# Patient Record
Sex: FEMALE | Race: OTHER | HISPANIC OR LATINO | URBAN - METROPOLITAN AREA
[De-identification: names, ages, dates, MRNs, and addresses within clinical notes are randomized per-mention and may not be internally consistent; named-entity substitution may affect disease eponyms.]

---

## 2017-02-16 ENCOUNTER — EMERGENCY (EMERGENCY)
Facility: HOSPITAL | Age: 22
LOS: 1 days | Discharge: HOME | End: 2017-02-16
Attending: EMERGENCY MEDICINE | Admitting: EMERGENCY MEDICINE
Payer: MEDICAID

## 2017-02-16 VITALS
OXYGEN SATURATION: 100 % | RESPIRATION RATE: 12 BRPM | DIASTOLIC BLOOD PRESSURE: 77 MMHG | HEIGHT: 63 IN | HEART RATE: 95 BPM | TEMPERATURE: 98 F | SYSTOLIC BLOOD PRESSURE: 135 MMHG | WEIGHT: 130.07 LBS

## 2017-02-16 VITALS
TEMPERATURE: 98 F | RESPIRATION RATE: 20 BRPM | DIASTOLIC BLOOD PRESSURE: 73 MMHG | HEART RATE: 76 BPM | SYSTOLIC BLOOD PRESSURE: 113 MMHG | OXYGEN SATURATION: 99 %

## 2017-02-16 DIAGNOSIS — M54.5 LOW BACK PAIN: ICD-10-CM

## 2017-02-16 LAB
APPEARANCE UR: CLEAR — SIGNIFICANT CHANGE UP
BACTERIA # UR AUTO: ABNORMAL
BILIRUB UR-MCNC: NEGATIVE — SIGNIFICANT CHANGE UP
COLOR SPEC: YELLOW — SIGNIFICANT CHANGE UP
DIFF PNL FLD: ABNORMAL
EPI CELLS # UR: ABNORMAL
GLUCOSE UR QL: NEGATIVE — SIGNIFICANT CHANGE UP
HCG UR QL: NEGATIVE — SIGNIFICANT CHANGE UP
KETONES UR-MCNC: NEGATIVE — SIGNIFICANT CHANGE UP
LEUKOCYTE ESTERASE UR-ACNC: ABNORMAL
NITRITE UR-MCNC: NEGATIVE — SIGNIFICANT CHANGE UP
PH UR: 5 — SIGNIFICANT CHANGE UP (ref 4.8–8)
PROT UR-MCNC: NEGATIVE — SIGNIFICANT CHANGE UP
RBC CASTS # UR COMP ASSIST: SIGNIFICANT CHANGE UP /HPF (ref 0–4)
SP GR SPEC: 1.01 — SIGNIFICANT CHANGE UP (ref 1.01–1.02)
UROBILINOGEN FLD QL: NEGATIVE — SIGNIFICANT CHANGE UP
WBC UR QL: SIGNIFICANT CHANGE UP

## 2017-02-16 PROCEDURE — 87086 URINE CULTURE/COLONY COUNT: CPT

## 2017-02-16 PROCEDURE — 81025 URINE PREGNANCY TEST: CPT

## 2017-02-16 PROCEDURE — 99283 EMERGENCY DEPT VISIT LOW MDM: CPT

## 2017-02-16 PROCEDURE — 81001 URINALYSIS AUTO W/SCOPE: CPT

## 2017-02-16 RX ORDER — IBUPROFEN 200 MG
600 TABLET ORAL ONCE
Qty: 0 | Refills: 0 | Status: COMPLETED | OUTPATIENT
Start: 2017-02-16 | End: 2017-02-16

## 2017-02-16 RX ADMIN — Medication 600 MILLIGRAM(S): at 22:10

## 2017-02-16 NOTE — ED ADULT NURSE REASSESSMENT NOTE - NS ED NURSE REASSESS COMMENT FT1
patient alert oriented follows command, complain of left lower back pain 10/10, denies N/V
discharge instructions explained and a hard copy provided, patient is been discharged in stable conditions

## 2017-02-16 NOTE — ED PROVIDER NOTE - OBJECTIVE STATEMENT
pt c/o approx 1 week of left low back pain. pt reports frequently lifting heavy objects at work recently. no fevers, chills, weakness, numbness, incontinence, abd pain, dysuria, hematuria. pain worse with changes in position  ortho - none

## 2017-02-16 NOTE — ED PROVIDER NOTE - CHPI ED SYMPTOMS NEG
no difficulty bearing weight/no numbness/no bowel dysfunction/no bladder dysfunction/no motor function loss

## 2017-02-17 LAB
CULTURE RESULTS: NO GROWTH — SIGNIFICANT CHANGE UP
SPECIMEN SOURCE: SIGNIFICANT CHANGE UP

## 2017-11-06 ENCOUNTER — TRANSCRIPTION ENCOUNTER (OUTPATIENT)
Age: 22
End: 2017-11-06

## 2018-04-30 ENCOUNTER — EMERGENCY (EMERGENCY)
Facility: HOSPITAL | Age: 23
LOS: 1 days | Discharge: HOME | End: 2018-04-30
Attending: EMERGENCY MEDICINE | Admitting: EMERGENCY MEDICINE
Payer: SELF-PAY

## 2018-04-30 VITALS
HEIGHT: 63 IN | OXYGEN SATURATION: 99 % | SYSTOLIC BLOOD PRESSURE: 124 MMHG | DIASTOLIC BLOOD PRESSURE: 83 MMHG | RESPIRATION RATE: 15 BRPM | HEART RATE: 110 BPM | WEIGHT: 134.92 LBS | TEMPERATURE: 99 F

## 2018-04-30 LAB — S PYO AG SPEC QL IA: POSITIVE

## 2018-04-30 PROCEDURE — 99284 EMERGENCY DEPT VISIT MOD MDM: CPT

## 2018-04-30 RX ORDER — ACETAMINOPHEN 500 MG
650 TABLET ORAL ONCE
Qty: 0 | Refills: 0 | Status: COMPLETED | OUTPATIENT
Start: 2018-04-30 | End: 2018-04-30

## 2018-04-30 RX ORDER — AMOXICILLIN 250 MG/5ML
1 SUSPENSION, RECONSTITUTED, ORAL (ML) ORAL
Qty: 20 | Refills: 0 | OUTPATIENT
Start: 2018-04-30 | End: 2018-05-09

## 2018-04-30 RX ORDER — BENZOCAINE AND MENTHOL 5; 1 G/100ML; G/100ML
1 LIQUID ORAL ONCE
Qty: 0 | Refills: 0 | Status: COMPLETED | OUTPATIENT
Start: 2018-04-30 | End: 2018-04-30

## 2018-04-30 RX ADMIN — Medication 650 MILLIGRAM(S): at 22:28

## 2018-04-30 RX ADMIN — Medication 650 MILLIGRAM(S): at 21:26

## 2018-04-30 RX ADMIN — Medication 1 TABLET(S): at 22:31

## 2018-04-30 RX ADMIN — BENZOCAINE AND MENTHOL 1 LOZENGE: 5; 1 LIQUID ORAL at 21:26

## 2018-04-30 NOTE — ED PROVIDER NOTE - CHPI ED SYMPTOMS NEG
no blurred vision/no syncope/no change in level of consciousness/no chills/no numbness/no loss of consciousness/no nausea/no weakness/no fever/no vomiting

## 2018-04-30 NOTE — ED PROVIDER NOTE - THROAT FINDINGS
NO DROOLING/NO VESICLES/ULCERS/no redness/uvula midline/no vesicles/NO TONGUE ELEVATION/NO STRIDOR/no exudate

## 2018-04-30 NOTE — ED PROVIDER NOTE - OBJECTIVE STATEMENT
24 yo F with no med hx co sore throat and ear pain for 2 days. Denies fever, cough, N, V, or other symptom. Took Nyquil last night with some relief. Denies recent travel or sick contacts.

## 2018-05-01 PROCEDURE — 99283 EMERGENCY DEPT VISIT LOW MDM: CPT

## 2018-05-01 PROCEDURE — 87880 STREP A ASSAY W/OPTIC: CPT

## 2018-05-01 PROCEDURE — 87081 CULTURE SCREEN ONLY: CPT

## 2018-05-01 RX ORDER — AMOXICILLIN 250 MG/5ML
1 SUSPENSION, RECONSTITUTED, ORAL (ML) ORAL
Qty: 20 | Refills: 0 | OUTPATIENT
Start: 2018-05-01 | End: 2018-05-10

## 2018-05-01 RX ORDER — AMOXICILLIN 250 MG/5ML
1 SUSPENSION, RECONSTITUTED, ORAL (ML) ORAL
Qty: 20 | Refills: 0
Start: 2018-05-01 | End: 2018-05-10

## 2019-02-28 ENCOUNTER — EMERGENCY (EMERGENCY)
Facility: HOSPITAL | Age: 24
LOS: 1 days | Discharge: HOME | End: 2019-02-28
Attending: EMERGENCY MEDICINE | Admitting: EMERGENCY MEDICINE
Payer: MEDICAID

## 2019-02-28 VITALS
HEART RATE: 67 BPM | RESPIRATION RATE: 18 BRPM | OXYGEN SATURATION: 100 % | DIASTOLIC BLOOD PRESSURE: 76 MMHG | SYSTOLIC BLOOD PRESSURE: 116 MMHG | HEIGHT: 63 IN | TEMPERATURE: 99 F | WEIGHT: 145.06 LBS

## 2019-02-28 VITALS
DIASTOLIC BLOOD PRESSURE: 61 MMHG | OXYGEN SATURATION: 99 % | RESPIRATION RATE: 16 BRPM | SYSTOLIC BLOOD PRESSURE: 96 MMHG | HEART RATE: 79 BPM | TEMPERATURE: 98 F

## 2019-02-28 LAB
ANION GAP SERPL CALC-SCNC: 7 MMOL/L — SIGNIFICANT CHANGE UP (ref 5–17)
BASOPHILS # BLD AUTO: 0.02 K/UL — SIGNIFICANT CHANGE UP (ref 0–0.2)
BASOPHILS NFR BLD AUTO: 0.3 % — SIGNIFICANT CHANGE UP (ref 0–2)
BUN SERPL-MCNC: 11 MG/DL — SIGNIFICANT CHANGE UP (ref 7–23)
CALCIUM SERPL-MCNC: 8.6 MG/DL — SIGNIFICANT CHANGE UP (ref 8.5–10.1)
CHLORIDE SERPL-SCNC: 108 MMOL/L — SIGNIFICANT CHANGE UP (ref 96–108)
CO2 SERPL-SCNC: 25 MMOL/L — SIGNIFICANT CHANGE UP (ref 22–31)
CREAT SERPL-MCNC: 0.71 MG/DL — SIGNIFICANT CHANGE UP (ref 0.5–1.3)
EOSINOPHIL # BLD AUTO: 0.03 K/UL — SIGNIFICANT CHANGE UP (ref 0–0.5)
EOSINOPHIL NFR BLD AUTO: 0.4 % — SIGNIFICANT CHANGE UP (ref 0–6)
GLUCOSE SERPL-MCNC: 101 MG/DL — HIGH (ref 70–99)
HCG SERPL-ACNC: <1 MIU/ML — SIGNIFICANT CHANGE UP
HCT VFR BLD CALC: 41.1 % — SIGNIFICANT CHANGE UP (ref 34.5–45)
HGB BLD-MCNC: 13.4 G/DL — SIGNIFICANT CHANGE UP (ref 11.5–15.5)
IMM GRANULOCYTES NFR BLD AUTO: 0.1 % — SIGNIFICANT CHANGE UP (ref 0–1.5)
LYMPHOCYTES # BLD AUTO: 2.38 K/UL — SIGNIFICANT CHANGE UP (ref 1–3.3)
LYMPHOCYTES # BLD AUTO: 30.9 % — SIGNIFICANT CHANGE UP (ref 13–44)
MCHC RBC-ENTMCNC: 30 PG — SIGNIFICANT CHANGE UP (ref 27–34)
MCHC RBC-ENTMCNC: 32.6 GM/DL — SIGNIFICANT CHANGE UP (ref 32–36)
MCV RBC AUTO: 92.2 FL — SIGNIFICANT CHANGE UP (ref 80–100)
MONOCYTES # BLD AUTO: 0.57 K/UL — SIGNIFICANT CHANGE UP (ref 0–0.9)
MONOCYTES NFR BLD AUTO: 7.4 % — SIGNIFICANT CHANGE UP (ref 2–14)
NEUTROPHILS # BLD AUTO: 4.68 K/UL — SIGNIFICANT CHANGE UP (ref 1.8–7.4)
NEUTROPHILS NFR BLD AUTO: 60.9 % — SIGNIFICANT CHANGE UP (ref 43–77)
NRBC # BLD: 0 /100 WBCS — SIGNIFICANT CHANGE UP (ref 0–0)
PLATELET # BLD AUTO: 249 K/UL — SIGNIFICANT CHANGE UP (ref 150–400)
POTASSIUM SERPL-MCNC: 4 MMOL/L — SIGNIFICANT CHANGE UP (ref 3.5–5.3)
POTASSIUM SERPL-SCNC: 4 MMOL/L — SIGNIFICANT CHANGE UP (ref 3.5–5.3)
RBC # BLD: 4.46 M/UL — SIGNIFICANT CHANGE UP (ref 3.8–5.2)
RBC # FLD: 13 % — SIGNIFICANT CHANGE UP (ref 10.3–14.5)
SODIUM SERPL-SCNC: 140 MMOL/L — SIGNIFICANT CHANGE UP (ref 135–145)
WBC # BLD: 7.69 K/UL — SIGNIFICANT CHANGE UP (ref 3.8–10.5)
WBC # FLD AUTO: 7.69 K/UL — SIGNIFICANT CHANGE UP (ref 3.8–10.5)

## 2019-02-28 PROCEDURE — 70450 CT HEAD/BRAIN W/O DYE: CPT | Mod: 26

## 2019-02-28 PROCEDURE — 72125 CT NECK SPINE W/O DYE: CPT | Mod: 26

## 2019-02-28 PROCEDURE — 93005 ELECTROCARDIOGRAM TRACING: CPT

## 2019-02-28 PROCEDURE — 80048 BASIC METABOLIC PNL TOTAL CA: CPT

## 2019-02-28 PROCEDURE — 85027 COMPLETE CBC AUTOMATED: CPT

## 2019-02-28 PROCEDURE — 93010 ELECTROCARDIOGRAM REPORT: CPT

## 2019-02-28 PROCEDURE — 99284 EMERGENCY DEPT VISIT MOD MDM: CPT

## 2019-02-28 PROCEDURE — 72125 CT NECK SPINE W/O DYE: CPT

## 2019-02-28 PROCEDURE — 84702 CHORIONIC GONADOTROPIN TEST: CPT

## 2019-02-28 PROCEDURE — 99284 EMERGENCY DEPT VISIT MOD MDM: CPT | Mod: 25

## 2019-02-28 PROCEDURE — 70450 CT HEAD/BRAIN W/O DYE: CPT

## 2019-02-28 PROCEDURE — 96361 HYDRATE IV INFUSION ADD-ON: CPT

## 2019-02-28 PROCEDURE — 96374 THER/PROPH/DIAG INJ IV PUSH: CPT

## 2019-02-28 PROCEDURE — 36415 COLL VENOUS BLD VENIPUNCTURE: CPT

## 2019-02-28 RX ORDER — KETOROLAC TROMETHAMINE 30 MG/ML
30 SYRINGE (ML) INJECTION ONCE
Qty: 0 | Refills: 0 | Status: DISCONTINUED | OUTPATIENT
Start: 2019-02-28 | End: 2019-02-28

## 2019-02-28 RX ORDER — IBUPROFEN 200 MG
1 TABLET ORAL
Qty: 30 | Refills: 0
Start: 2019-02-28 | End: 2019-03-09

## 2019-02-28 RX ORDER — SODIUM CHLORIDE 9 MG/ML
1000 INJECTION INTRAMUSCULAR; INTRAVENOUS; SUBCUTANEOUS ONCE
Qty: 0 | Refills: 0 | Status: COMPLETED | OUTPATIENT
Start: 2019-02-28 | End: 2019-02-28

## 2019-02-28 RX ORDER — DIAZEPAM 5 MG
5 TABLET ORAL ONCE
Qty: 0 | Refills: 0 | Status: DISCONTINUED | OUTPATIENT
Start: 2019-02-28 | End: 2019-02-28

## 2019-02-28 RX ORDER — LIDOCAINE 4 G/100G
1 CREAM TOPICAL ONCE
Qty: 0 | Refills: 0 | Status: COMPLETED | OUTPATIENT
Start: 2019-02-28 | End: 2019-02-28

## 2019-02-28 RX ORDER — CYCLOBENZAPRINE HYDROCHLORIDE 10 MG/1
1 TABLET, FILM COATED ORAL
Qty: 20 | Refills: 0
Start: 2019-02-28 | End: 2019-03-06

## 2019-02-28 RX ADMIN — SODIUM CHLORIDE 1000 MILLILITER(S): 9 INJECTION INTRAMUSCULAR; INTRAVENOUS; SUBCUTANEOUS at 13:47

## 2019-02-28 RX ADMIN — SODIUM CHLORIDE 1000 MILLILITER(S): 9 INJECTION INTRAMUSCULAR; INTRAVENOUS; SUBCUTANEOUS at 12:24

## 2019-02-28 RX ADMIN — LIDOCAINE 1 PATCH: 4 CREAM TOPICAL at 12:23

## 2019-02-28 RX ADMIN — Medication 30 MILLIGRAM(S): at 13:47

## 2019-02-28 RX ADMIN — Medication 5 MILLIGRAM(S): at 12:23

## 2019-02-28 RX ADMIN — Medication 30 MILLIGRAM(S): at 12:23

## 2019-02-28 NOTE — ED PROVIDER NOTE - CLINICAL SUMMARY MEDICAL DECISION MAKING FREE TEXT BOX
syncopal event that occurred this am. also right sided neck pain and tightness when she woke up (history of similar pain in the past). will CT head and neck. will also check labs, preg, and EKG. will give IVF, toradol, valium, and lidocaine patch.

## 2019-02-28 NOTE — ED PROVIDER NOTE - PROGRESS NOTE DETAILS
Reevaluated patient at bedside.  Patient feeling improved.  Discussed the results of all diagnostic testing in ED and copies of all reports given.   An opportunity to ask questions was given.  Discussed the importance of prompt, close medical follow-up.  Patient will return with any changes, concerns or persistent / worsening symptoms.  Understanding of all instructions verbalized.  stretches to neck explained and demonstrated. will continue motrin and flexeril. will drink plenty of fluids. referral to PCP clinic and spine center provided

## 2019-02-28 NOTE — ED PROVIDER NOTE - ATTENDING CONTRIBUTION TO CARE
Pt is a 23 yo female who presents to the ED with a cc of syncope.  PMHx of fibroadenoma of breast.  Pt reports that she awoke this morning with a "stiff neck."  Pt reports that she had a right sided spasm.  Denies injury to her neck.  She reports that this has happened prior but never as severe.  She was able to get out of bed with help but reported pain to her neck.  Butts then states that she went to the restroom, urinated and then while standing to wash her hands she suddenly had a flash of heat come over her, she became lightheaded and had a syncopal episode.  Denies prior syncopal episode.  Pt does report that she felt nausea just prior to this.  Still reporting neck pain but denies HA, visual changes, N/V/D/C at this time, CP, SOB, abd pain, ext numbness or weakness.  Denies fever, chills.  On exam pt lying in bed NAD, NCAT, PERRL, EOMI, heart RRR, lungs CTA, abd soft NT/ND.  No midline C/T/L TTP no acute step offs or deformities noted.  TTP to right paraspinal cervical muscle region with increased tone and spasm noted.  CN II-XII intact.  Will obtain screening labs, EKG, CT head/cervical spine.  Will medicate for symptoms.  Agree with above plan of care

## 2019-02-28 NOTE — ED PROVIDER NOTE - MUSCULOSKELETAL, MLM
Spine appears normal, range of motion is not limited, no vert step off deformities. soft tissue tenderness and spasm to right upper trap and scalenes.

## 2019-02-28 NOTE — ED PROVIDER NOTE - OBJECTIVE STATEMENT
presents with right sided neck pain and syncopal event that occurred this am. patient woke up with stiff right neck (history of same, but worse in intensity this am). called her parents to help her out of bed. took some tylenol and then went to the bathroom. while washing her hands, started to feel hot, lightheaded, and tunnel vision. states "she passed out, but mother was able to help ease her to the ground". believes "she was out just for a few seconds" but not certain. does not take any blood thinners. denies change of pregnancy. denies history of similar symptoms. no chest pain or YASMEEN. no HA, dizziness, or confusion. continues to have pain and tightness to right side of neck "feels muscular".   no PCP

## 2019-02-28 NOTE — ED ADULT NURSE NOTE - OBJECTIVE STATEMENT
states she woke up with neck pain walked to BR became dizzy and experienced a sudden LOC  denies fever/chills

## 2019-06-27 ENCOUNTER — EMERGENCY (EMERGENCY)
Facility: HOSPITAL | Age: 24
LOS: 1 days | Discharge: HOME | End: 2019-06-27
Attending: EMERGENCY MEDICINE | Admitting: EMERGENCY MEDICINE
Payer: SELF-PAY

## 2019-06-27 VITALS
HEART RATE: 74 BPM | SYSTOLIC BLOOD PRESSURE: 113 MMHG | DIASTOLIC BLOOD PRESSURE: 74 MMHG | OXYGEN SATURATION: 100 % | TEMPERATURE: 99 F | RESPIRATION RATE: 14 BRPM | WEIGHT: 130.07 LBS

## 2019-06-27 VITALS
OXYGEN SATURATION: 99 % | SYSTOLIC BLOOD PRESSURE: 111 MMHG | TEMPERATURE: 98 F | DIASTOLIC BLOOD PRESSURE: 74 MMHG | RESPIRATION RATE: 15 BRPM | HEART RATE: 67 BPM

## 2019-06-27 LAB
ALBUMIN SERPL ELPH-MCNC: 3.8 G/DL — SIGNIFICANT CHANGE UP (ref 3.3–5)
ALP SERPL-CCNC: 55 U/L — SIGNIFICANT CHANGE UP (ref 40–120)
ALT FLD-CCNC: 18 U/L — SIGNIFICANT CHANGE UP (ref 12–78)
ANION GAP SERPL CALC-SCNC: 8 MMOL/L — SIGNIFICANT CHANGE UP (ref 5–17)
AST SERPL-CCNC: 13 U/L — LOW (ref 15–37)
BASOPHILS # BLD AUTO: 0.02 K/UL — SIGNIFICANT CHANGE UP (ref 0–0.2)
BASOPHILS NFR BLD AUTO: 0.3 % — SIGNIFICANT CHANGE UP (ref 0–2)
BILIRUB SERPL-MCNC: 0.5 MG/DL — SIGNIFICANT CHANGE UP (ref 0.2–1.2)
BUN SERPL-MCNC: 9 MG/DL — SIGNIFICANT CHANGE UP (ref 7–23)
CALCIUM SERPL-MCNC: 8.7 MG/DL — SIGNIFICANT CHANGE UP (ref 8.5–10.1)
CHLORIDE SERPL-SCNC: 109 MMOL/L — HIGH (ref 96–108)
CO2 SERPL-SCNC: 25 MMOL/L — SIGNIFICANT CHANGE UP (ref 22–31)
CREAT SERPL-MCNC: 0.61 MG/DL — SIGNIFICANT CHANGE UP (ref 0.5–1.3)
EOSINOPHIL # BLD AUTO: 0.01 K/UL — SIGNIFICANT CHANGE UP (ref 0–0.5)
EOSINOPHIL NFR BLD AUTO: 0.2 % — SIGNIFICANT CHANGE UP (ref 0–6)
GLUCOSE SERPL-MCNC: 101 MG/DL — HIGH (ref 70–99)
HCG SERPL-ACNC: <1 MIU/ML — SIGNIFICANT CHANGE UP
HCT VFR BLD CALC: 38.3 % — SIGNIFICANT CHANGE UP (ref 34.5–45)
HGB BLD-MCNC: 12.8 G/DL — SIGNIFICANT CHANGE UP (ref 11.5–15.5)
IMM GRANULOCYTES NFR BLD AUTO: 0.3 % — SIGNIFICANT CHANGE UP (ref 0–1.5)
LIDOCAIN IGE QN: 92 U/L — SIGNIFICANT CHANGE UP (ref 73–393)
LYMPHOCYTES # BLD AUTO: 1.82 K/UL — SIGNIFICANT CHANGE UP (ref 1–3.3)
LYMPHOCYTES # BLD AUTO: 28 % — SIGNIFICANT CHANGE UP (ref 13–44)
MCHC RBC-ENTMCNC: 29.9 PG — SIGNIFICANT CHANGE UP (ref 27–34)
MCHC RBC-ENTMCNC: 33.4 GM/DL — SIGNIFICANT CHANGE UP (ref 32–36)
MCV RBC AUTO: 89.5 FL — SIGNIFICANT CHANGE UP (ref 80–100)
MONOCYTES # BLD AUTO: 0.46 K/UL — SIGNIFICANT CHANGE UP (ref 0–0.9)
MONOCYTES NFR BLD AUTO: 7.1 % — SIGNIFICANT CHANGE UP (ref 2–14)
NEUTROPHILS # BLD AUTO: 4.17 K/UL — SIGNIFICANT CHANGE UP (ref 1.8–7.4)
NEUTROPHILS NFR BLD AUTO: 64.1 % — SIGNIFICANT CHANGE UP (ref 43–77)
NRBC # BLD: 0 /100 WBCS — SIGNIFICANT CHANGE UP (ref 0–0)
PLATELET # BLD AUTO: 246 K/UL — SIGNIFICANT CHANGE UP (ref 150–400)
POTASSIUM SERPL-MCNC: 3.7 MMOL/L — SIGNIFICANT CHANGE UP (ref 3.5–5.3)
POTASSIUM SERPL-SCNC: 3.7 MMOL/L — SIGNIFICANT CHANGE UP (ref 3.5–5.3)
PROT SERPL-MCNC: 7.4 G/DL — SIGNIFICANT CHANGE UP (ref 6–8.3)
RBC # BLD: 4.28 M/UL — SIGNIFICANT CHANGE UP (ref 3.8–5.2)
RBC # FLD: 13.2 % — SIGNIFICANT CHANGE UP (ref 10.3–14.5)
SODIUM SERPL-SCNC: 142 MMOL/L — SIGNIFICANT CHANGE UP (ref 135–145)
WBC # BLD: 6.5 K/UL — SIGNIFICANT CHANGE UP (ref 3.8–10.5)
WBC # FLD AUTO: 6.5 K/UL — SIGNIFICANT CHANGE UP (ref 3.8–10.5)

## 2019-06-27 PROCEDURE — 36415 COLL VENOUS BLD VENIPUNCTURE: CPT

## 2019-06-27 PROCEDURE — 99285 EMERGENCY DEPT VISIT HI MDM: CPT

## 2019-06-27 PROCEDURE — 76856 US EXAM PELVIC COMPLETE: CPT

## 2019-06-27 PROCEDURE — 76856 US EXAM PELVIC COMPLETE: CPT | Mod: 26

## 2019-06-27 PROCEDURE — 80053 COMPREHEN METABOLIC PANEL: CPT

## 2019-06-27 PROCEDURE — 84702 CHORIONIC GONADOTROPIN TEST: CPT

## 2019-06-27 PROCEDURE — 99284 EMERGENCY DEPT VISIT MOD MDM: CPT | Mod: 25

## 2019-06-27 PROCEDURE — 96374 THER/PROPH/DIAG INJ IV PUSH: CPT | Mod: XU

## 2019-06-27 PROCEDURE — 85027 COMPLETE CBC AUTOMATED: CPT

## 2019-06-27 PROCEDURE — 83690 ASSAY OF LIPASE: CPT

## 2019-06-27 PROCEDURE — 74177 CT ABD & PELVIS W/CONTRAST: CPT | Mod: 26

## 2019-06-27 PROCEDURE — 74177 CT ABD & PELVIS W/CONTRAST: CPT

## 2019-06-27 RX ORDER — ONDANSETRON 8 MG/1
4 TABLET, FILM COATED ORAL ONCE
Refills: 0 | Status: COMPLETED | OUTPATIENT
Start: 2019-06-27 | End: 2019-06-27

## 2019-06-27 RX ORDER — SODIUM CHLORIDE 9 MG/ML
1000 INJECTION INTRAMUSCULAR; INTRAVENOUS; SUBCUTANEOUS ONCE
Refills: 0 | Status: COMPLETED | OUTPATIENT
Start: 2019-06-27 | End: 2019-06-27

## 2019-06-27 RX ORDER — ONDANSETRON 8 MG/1
1 TABLET, FILM COATED ORAL
Qty: 9 | Refills: 0
Start: 2019-06-27 | End: 2019-06-29

## 2019-06-27 RX ORDER — IOHEXOL 300 MG/ML
30 INJECTION, SOLUTION INTRAVENOUS ONCE
Refills: 0 | Status: COMPLETED | OUTPATIENT
Start: 2019-06-27 | End: 2019-06-27

## 2019-06-27 RX ADMIN — ONDANSETRON 4 MILLIGRAM(S): 8 TABLET, FILM COATED ORAL at 09:36

## 2019-06-27 RX ADMIN — SODIUM CHLORIDE 1000 MILLILITER(S): 9 INJECTION INTRAMUSCULAR; INTRAVENOUS; SUBCUTANEOUS at 09:36

## 2019-06-27 RX ADMIN — IOHEXOL 30 MILLILITER(S): 300 INJECTION, SOLUTION INTRAVENOUS at 09:36

## 2019-06-27 NOTE — ED PROVIDER NOTE - PHYSICAL EXAMINATION
Gen: Alert, NAD  Head/eyes: NC/AT, PERRL, EOMI, normal lids/conjunctiva, no scleral icterus  ENT: airway patent  Neck: supple, no tenderness/meningismus/JVD, Trachea midline  Pulm/lung: Bilateral clear BS, normal resp effort, no wheeze/stridor/retractions  CV/heart: RRR, no M/R/G, +2 dist pulses (radial, pedal DP/PT, popliteal)  GI/Abd: soft, +ttp mid abdomen, +ttp RLQ and LLQ/ND, +BS, no guarding/rebound tenderness  Musculoskeletal: no edema/erythema/cyanosis, FROM in all extremities, no C/T/L spine ttp  Skin: no rash, no vesicles, no petechaie, no ecchymosis, no swelling  Neuro: AAOx3, CN 2-12 intact, normal sensation, 5/5 motor strength in all extremities, normal gait, no dysmetria Gen: Alert, NAD  Head/eyes: NC/AT, PERRL, EOMI  ENT: airway patent  Neck: supple, no tenderness/meningismus/JVD, Trachea midline  Pulm/lung: Bilateral clear BS, normal resp effort, no wheeze/stridor/retractions  CV/heart: RRR, no M/R/G, +2 dist pulses (radial, pedal DP/PT, popliteal)  GI/Abd: soft, +ttp mid abdomen, +ttp RLQ and LLQ/ND, +BS, no guarding/rebound tenderness  Musculoskeletal: no edema/erythema/cyanosis, FROM in all extremities, no C/T/L spine ttp  Skin: no rash, no vesicles, no petechaie, no ecchymosis, no swelling  Neuro: AAOx3, CN 2-12 intact, normal sensation, 5/5 motor strength in all extremities, normal gait, no dysmetria

## 2019-06-27 NOTE — ED PROVIDER NOTE - OBJECTIVE STATEMENT
25 yo female no pmhx c/o 1 week of intermittent periumbilical and lower abdominal pain with associated nausea/vomiting.  Nonbloody, nonbilious. Moderate, nonradiating.  No fever/chills.  States she vomits anything she takes in orally.

## 2019-06-27 NOTE — ED ADULT NURSE NOTE - NSIMPLEMENTINTERV_GEN_ALL_ED
Implemented All Universal Safety Interventions:  [place] to call system. Call bell, personal items and telephone within reach. Instruct patient to call for assistance. Room bathroom lighting operational. Non-slip footwear when patient is off stretcher. Physically safe environment: no spills, clutter or unnecessary equipment. Stretcher in lowest position, wheels locked, appropriate side rails in place.

## 2019-06-27 NOTE — ED PROVIDER NOTE - CARE PROVIDER_API CALL
Haja Calvin ()  Internal Medicine  [address] [place] [address]  Phone: [phone]  Fax: [phone]  Follow Up Time:

## 2019-06-27 NOTE — ED PROVIDER NOTE - NSFOLLOWUPINSTRUCTIONS_ED_ALL_ED_FT
1) Follow-up with Dr. Calvin. Call today / next business day for prompt follow-up.  2) Return to Emergency room for any worsening or persistent pain, weakness, fever, or any other concerning symptoms.  3) See attached instruction sheets for additional information, including information regarding signs and symptoms to look out for, reasons to seek immediate care and other important instructions.  4) Zofran 4mg every 8 hours as needed for nausea

## 2019-06-27 NOTE — ED ADULT NURSE NOTE - OBJECTIVE STATEMENT
received pt in bed #8a Pt A&O c/o n/v x 1 week & pain that is only w/ the vomiting. Pt also states she had 2 episodes of diarrhea Pt seen by Dr Banda Will monitor

## 2019-06-27 NOTE — ED PROVIDER NOTE - NS ED ROS FT

## 2019-06-27 NOTE — ED ADULT NURSE REASSESSMENT NOTE - NS ED NURSE REASSESS COMMENT FT1
Report received from Carli SALCIDO. Patient received at 1100. Patient states pain is improved. No complaints voiced at this time. PIV intact. Plan of care discussed with patient. Comfort and safety maintained. Will continue to monitor.

## 2020-03-18 NOTE — ED ADULT NURSE NOTE - PAIN: PRESENCE, MLM
Patient: Jackie Johnson    Procedure Summary     Date:  03/18/20 Room / Location:   [facility] OR 03 /  [facility]    Anesthesia Start:  0752 Anesthesia Stop:  0830    Procedure:  INSERTION OF PORTACATH (N/A ) Diagnosis:       Breast cancer (CMS/HCC)      (Breast cancer (CMS/HCC) [C50.919])    Surgeon:  Dudley Aldridge MD Provider:  Casa Shah MD    Anesthesia Type:  general ASA Status:  3          Anesthesia Type: general    Vitals  Vitals Value Taken Time   /73 3/18/2020  8:31 AM   Temp 97.7 °F (36.5 °C) 3/18/2020  8:31 AM   Pulse 59 3/18/2020  8:31 AM   Resp 18 3/18/2020  8:31 AM   SpO2 100 % 3/18/2020  8:31 AM           Post Anesthesia Care and Evaluation    Patient location during evaluation: PHASE II  Patient participation: complete - patient participated  Level of consciousness: awake and alert  Pain score: 0  Pain management: adequate  Airway patency: patent  Anesthetic complications: No anesthetic complications    Cardiovascular status: acceptable  Respiratory status: acceptable  Hydration status: acceptable      
complains of pain/discomfort

## 2020-04-17 NOTE — ED ADULT NURSE NOTE - HEART RATE (BEATS/MIN)
amitriptyline (ELAVIL) 75 MG tablet (Discontinued)  Patient states that headaches have now started back up and would like to start this medication again  Please advise  Patient is willing to do a phone visit if necessary   67

## 2020-09-25 NOTE — ED ADULT NURSE NOTE - NS ED NURSE RECORD ANOTHER HT AND WT
"HPI:   47 y.o.   OB History        6    Para   5    Term   3       1    AB   1    Living   2       SAB   1    TAB   0    Ectopic   0    Multiple   0    Live Births   2              Patient's last menstrual period was 2020.    Patient is  here for her annual gynecologic exam.  She has no complaints.     ROS:  GENERAL: No fever, chills, fatigability or weight loss.  SKIN: No rashes, itching or changes in color or texture of skin.  HEAD: No headaches or recent head trauma.  EYES: Visual acuity fine. No photophobia, ocular pain or diplopia.  EARS: Denies ear pain, discharge or vertigo.  NOSE: No loss of smell, no epistaxis or postnasal drip.  MOUTH & THROAT: No hoarseness or change in voice. No excessive gum bleeding.  NODES: Denies swollen glands.  CHEST: Denies OROURKE, cyanosis, wheezing, cough and sputum production.  CARDIOVASCULAR: Denies chest pain, PND, orthopnea or reduced exercise tolerance.  ABDOMEN: Appetite fine. No weight loss. Denies diarrhea, abdominal pain, hematemesis or blood in stool.  URINARY: No flank pain, dysuria or hematuria.  PERIPHERAL VASCULAR: No claudication or cyanosis.  MUSCULOSKELETAL: No joint stiffness or swelling. Denies back pain.  NEUROLOGIC: No history of seizures, paralysis, alteration of gait or coordination.    PE:   /88   Ht 5' 8" (1.727 m)   Wt 117 kg (257 lb 15 oz)   LMP 2020   BMI 39.22 kg/m²   APPEARANCE: Well nourished, well developed, in no acute distress.  NECK: Neck symmetric without masses or thyromegaly.  BREASTS: Symmetrical, no skin changes or visible lesions. No palpable masses, nipple discharge or adenopathy bilaterally.  ABDOMEN: Flat. Soft. No tenderness or masses. No hepatosplenomegaly. No hernias. No CVA tenderness.  VULVA: No lesions. Normal female genitalia.  URETHRAL MEATUS: Normal size and location, no lesions, no prolapse.  URETHRA: No masses, tenderness, prolapse or scarring.  VAGINA: Moist and well rugated, no discharge, " no significant cystocele or rectocele.  CERVIX: No lesions and discharge. PAP done.  UTERUS: Normal size, regular shape, mobile, non-tender, bladder base nontender.  ADNEXA: No masses, tenderness or CDS nodularity.  ANUS PERINEUM: Normal.    PROCEDURES:  Pap smear    Assessment:  Normal Gynecologic Exam    Plan:  Mammogram and Colonoscopy if indicated by current recommendations.  Return to clinic in one year or for any problems or complaints.  Reg cycles,     Yes

## 2021-12-18 ENCOUNTER — EMERGENCY (EMERGENCY)
Facility: HOSPITAL | Age: 26
LOS: 1 days | Discharge: HOME | End: 2021-12-18
Attending: EMERGENCY MEDICINE | Admitting: EMERGENCY MEDICINE
Payer: COMMERCIAL

## 2021-12-18 VITALS
WEIGHT: 139.99 LBS | OXYGEN SATURATION: 97 % | RESPIRATION RATE: 18 BRPM | DIASTOLIC BLOOD PRESSURE: 78 MMHG | HEART RATE: 113 BPM | HEIGHT: 63 IN | TEMPERATURE: 99 F | SYSTOLIC BLOOD PRESSURE: 126 MMHG

## 2021-12-18 VITALS
DIASTOLIC BLOOD PRESSURE: 64 MMHG | OXYGEN SATURATION: 98 % | HEART RATE: 89 BPM | RESPIRATION RATE: 18 BRPM | SYSTOLIC BLOOD PRESSURE: 100 MMHG | TEMPERATURE: 99 F

## 2021-12-18 LAB
ALBUMIN SERPL ELPH-MCNC: 3.7 G/DL — SIGNIFICANT CHANGE UP (ref 3.3–5)
ALP SERPL-CCNC: 54 U/L — SIGNIFICANT CHANGE UP (ref 40–120)
ALT FLD-CCNC: 48 U/L — SIGNIFICANT CHANGE UP (ref 12–78)
ANION GAP SERPL CALC-SCNC: 6 MMOL/L — SIGNIFICANT CHANGE UP (ref 5–17)
AST SERPL-CCNC: 30 U/L — SIGNIFICANT CHANGE UP (ref 15–37)
BASOPHILS # BLD AUTO: 0.02 K/UL — SIGNIFICANT CHANGE UP (ref 0–0.2)
BASOPHILS NFR BLD AUTO: 0.2 % — SIGNIFICANT CHANGE UP (ref 0–2)
BILIRUB SERPL-MCNC: 0.7 MG/DL — SIGNIFICANT CHANGE UP (ref 0.2–1.2)
BUN SERPL-MCNC: 15 MG/DL — SIGNIFICANT CHANGE UP (ref 7–23)
CALCIUM SERPL-MCNC: 8.6 MG/DL — SIGNIFICANT CHANGE UP (ref 8.5–10.1)
CHLORIDE SERPL-SCNC: 106 MMOL/L — SIGNIFICANT CHANGE UP (ref 96–108)
CO2 SERPL-SCNC: 26 MMOL/L — SIGNIFICANT CHANGE UP (ref 22–31)
CREAT SERPL-MCNC: 0.78 MG/DL — SIGNIFICANT CHANGE UP (ref 0.5–1.3)
EOSINOPHIL # BLD AUTO: 0 K/UL — SIGNIFICANT CHANGE UP (ref 0–0.5)
EOSINOPHIL NFR BLD AUTO: 0 % — SIGNIFICANT CHANGE UP (ref 0–6)
GLUCOSE SERPL-MCNC: 110 MG/DL — HIGH (ref 70–99)
HCT VFR BLD CALC: 39.7 % — SIGNIFICANT CHANGE UP (ref 34.5–45)
HGB BLD-MCNC: 13.8 G/DL — SIGNIFICANT CHANGE UP (ref 11.5–15.5)
IMM GRANULOCYTES NFR BLD AUTO: 0.4 % — SIGNIFICANT CHANGE UP (ref 0–1.5)
LYMPHOCYTES # BLD AUTO: 0.92 K/UL — LOW (ref 1–3.3)
LYMPHOCYTES # BLD AUTO: 11.4 % — LOW (ref 13–44)
MCHC RBC-ENTMCNC: 30.9 PG — SIGNIFICANT CHANGE UP (ref 27–34)
MCHC RBC-ENTMCNC: 34.8 GM/DL — SIGNIFICANT CHANGE UP (ref 32–36)
MCV RBC AUTO: 88.8 FL — SIGNIFICANT CHANGE UP (ref 80–100)
MONOCYTES # BLD AUTO: 0.52 K/UL — SIGNIFICANT CHANGE UP (ref 0–0.9)
MONOCYTES NFR BLD AUTO: 6.5 % — SIGNIFICANT CHANGE UP (ref 2–14)
NEUTROPHILS # BLD AUTO: 6.55 K/UL — SIGNIFICANT CHANGE UP (ref 1.8–7.4)
NEUTROPHILS NFR BLD AUTO: 81.5 % — HIGH (ref 43–77)
NRBC # BLD: 0 /100 WBCS — SIGNIFICANT CHANGE UP (ref 0–0)
PLATELET # BLD AUTO: 229 K/UL — SIGNIFICANT CHANGE UP (ref 150–400)
POTASSIUM SERPL-MCNC: 3.5 MMOL/L — SIGNIFICANT CHANGE UP (ref 3.5–5.3)
POTASSIUM SERPL-SCNC: 3.5 MMOL/L — SIGNIFICANT CHANGE UP (ref 3.5–5.3)
PROT SERPL-MCNC: 7.9 G/DL — SIGNIFICANT CHANGE UP (ref 6–8.3)
RAPID RVP RESULT: SIGNIFICANT CHANGE UP
RBC # BLD: 4.47 M/UL — SIGNIFICANT CHANGE UP (ref 3.8–5.2)
RBC # FLD: 13.1 % — SIGNIFICANT CHANGE UP (ref 10.3–14.5)
SARS-COV-2 RNA SPEC QL NAA+PROBE: SIGNIFICANT CHANGE UP
SODIUM SERPL-SCNC: 138 MMOL/L — SIGNIFICANT CHANGE UP (ref 135–145)
WBC # BLD: 8.04 K/UL — SIGNIFICANT CHANGE UP (ref 3.8–10.5)
WBC # FLD AUTO: 8.04 K/UL — SIGNIFICANT CHANGE UP (ref 3.8–10.5)

## 2021-12-18 PROCEDURE — 0225U NFCT DS DNA&RNA 21 SARSCOV2: CPT

## 2021-12-18 PROCEDURE — 99284 EMERGENCY DEPT VISIT MOD MDM: CPT

## 2021-12-18 PROCEDURE — 36415 COLL VENOUS BLD VENIPUNCTURE: CPT

## 2021-12-18 PROCEDURE — 96374 THER/PROPH/DIAG INJ IV PUSH: CPT

## 2021-12-18 PROCEDURE — 83690 ASSAY OF LIPASE: CPT

## 2021-12-18 PROCEDURE — 80053 COMPREHEN METABOLIC PANEL: CPT

## 2021-12-18 PROCEDURE — 99284 EMERGENCY DEPT VISIT MOD MDM: CPT | Mod: 25

## 2021-12-18 PROCEDURE — 84702 CHORIONIC GONADOTROPIN TEST: CPT

## 2021-12-18 PROCEDURE — 85025 COMPLETE CBC W/AUTO DIFF WBC: CPT

## 2021-12-18 PROCEDURE — 96375 TX/PRO/DX INJ NEW DRUG ADDON: CPT

## 2021-12-18 PROCEDURE — 96361 HYDRATE IV INFUSION ADD-ON: CPT

## 2021-12-18 RX ORDER — ONDANSETRON 8 MG/1
4 TABLET, FILM COATED ORAL ONCE
Refills: 0 | Status: COMPLETED | OUTPATIENT
Start: 2021-12-18 | End: 2021-12-18

## 2021-12-18 RX ORDER — SODIUM CHLORIDE 9 MG/ML
1000 INJECTION INTRAMUSCULAR; INTRAVENOUS; SUBCUTANEOUS ONCE
Refills: 0 | Status: COMPLETED | OUTPATIENT
Start: 2021-12-18 | End: 2021-12-18

## 2021-12-18 RX ORDER — ONDANSETRON 8 MG/1
1 TABLET, FILM COATED ORAL
Qty: 20 | Refills: 0
Start: 2021-12-18 | End: 2021-12-22

## 2021-12-18 RX ORDER — PANTOPRAZOLE SODIUM 20 MG/1
40 TABLET, DELAYED RELEASE ORAL ONCE
Refills: 0 | Status: COMPLETED | OUTPATIENT
Start: 2021-12-18 | End: 2021-12-18

## 2021-12-18 RX ADMIN — ONDANSETRON 4 MILLIGRAM(S): 8 TABLET, FILM COATED ORAL at 18:44

## 2021-12-18 RX ADMIN — SODIUM CHLORIDE 1000 MILLILITER(S): 9 INJECTION INTRAMUSCULAR; INTRAVENOUS; SUBCUTANEOUS at 19:45

## 2021-12-18 RX ADMIN — SODIUM CHLORIDE 1000 MILLILITER(S): 9 INJECTION INTRAMUSCULAR; INTRAVENOUS; SUBCUTANEOUS at 18:44

## 2021-12-18 RX ADMIN — PANTOPRAZOLE SODIUM 40 MILLIGRAM(S): 20 TABLET, DELAYED RELEASE ORAL at 18:45

## 2021-12-18 NOTE — ED PROVIDER NOTE - CARE PROVIDER_API CALL
Mir Little; PhD)  Infectious Disease; Internal Medicine  [address] [place] [address]  Phone: [phone]  Fax: [phone]  Follow Up Time:

## 2021-12-18 NOTE — ED PROVIDER NOTE - NSICDXFAMILYHX_GEN_ALL_CORE_FT
FAMILY HISTORY:  Father  Still living? Yes, Estimated age: Age Unknown  Family history of diabetes mellitus, Age at diagnosis: Age Unknown  Family history of hypertension, Age at diagnosis: Age Unknown    Mother  Still living? Unknown  Family history of cervical cancer, Age at diagnosis: Age Unknown

## 2021-12-18 NOTE — ED ADULT TRIAGE NOTE - HISTORY OF COVID-19 VACCINATION
[facility] Radiation Treatment Management Note 6-10    Patient:  Kelley Tidwell  Age:  48year old  Visit Diagnosis:  No diagnosis found.   Primary Rad/Onc:  Dr. Lashay Hawthorne    Site Delivered Dose (Gy) Prescribed Dose (Gy) Fraction Yes

## 2021-12-18 NOTE — ED PROVIDER NOTE - CONSTITUTIONAL, MLM
Well appearing, young white female, awake, alert, oriented to person, place, time/situation and in no apparent distress. normal...

## 2021-12-18 NOTE — ED PROVIDER NOTE - NSFOLLOWUPINSTRUCTIONS_ED_ALL_ED_FT
Rest  Increase fluid intake  May take Zofran if needed for nausea/vomiting as directed  Follow-up with your doctor this week  Return here if needed          [employer]® CareNotes®     :  [facility]  	                       GASTROENTERITIS - AfterCare(R) Instructions(ER/ED)           Gastroenteritis    WHAT YOU NEED TO KNOW:    Gastroenteritis, or stomach flu, is an infection of the stomach and intestines.     Digestive Tract         DISCHARGE INSTRUCTIONS:    Call 911 for any of the following:   •You have trouble breathing or a very fast pulse.          Return to the emergency department if:   •You see blood in your diarrhea.      •You cannot stop vomiting.      •You have not urinated for 12 hours.       •You feel like you are going to faint.      Contact your healthcare provider if:   •You have a fever.      •You continue to vomit or have diarrhea, even after treatment.      •You see worms in your diarrhea.      •Your mouth or eyes are dry. You are not urinating as much or as often.      •You have questions or concerns about your condition or care.      Medicines:   •Medicines may be given to stop vomiting or diarrhea, decrease abdominal cramps, or treat an infection.      •Take your medicine as directed. Contact your healthcare provider if you think your medicine is not helping or if you have side effects. Tell him or her if you are allergic to any medicine. Keep a list of the medicines, vitamins, and herbs you take. Include the amounts, and when and why you take them. Bring the list or the pill bottles to follow-up visits. Carry your medicine list with you in case of an emergency.      Manage your symptoms:   •Drink liquids as directed. Ask your healthcare provider how much liquid to drink each day, and which liquids are best for you. You may also need to drink an oral rehydration solution (ORS). An ORS has the right amounts of sugar, salt, and minerals in water to replace body fluids.      •Eat bland foods. When you feel hungry, begin eating soft, bland foods. Examples are bananas, clear soup, potatoes, and applesauce. Do not have dairy products, alcohol, sugary drinks, or drinks with caffeine until you feel better.      •Rest as much as possible. Slowly start to do more each day when you begin to feel better.      Prevent the spread of gastroenteritis: Gastroenteritis can spread easily. Keep yourself, your family, and your surroundings clean to help prevent the spread of gastroenteritis:   •Wash your hands often. Use soap and water. Wash your hands after you use the bathroom, change a child's diapers, or sneeze. Wash your hands before you prepare or eat food.   Handwashing           •Clean surfaces and do laundry often. Wash your clothes and towels separately from the rest of the laundry. Clean surfaces in your home with antibacterial  or bleach.      •Clean food thoroughly and cook safely. Wash raw vegetables before you cook. Cook meat, fish, and eggs fully. Do not use the same dishes for raw meat as you do for other foods. Refrigerate any leftover food immediately.      •Be aware when you camp or travel. Drink only clean water. Do not drink from rivers or lakes unless you purify or boil the water first. When you travel, drink bottled water and do not add ice. Do not eat fruit that has not been peeled. Do not eat raw fish or meat that is not fully cooked.       Follow up with your doctor as directed: Write down your questions so you remember to ask them during your visits.        © Copyright [employer] 2021

## 2021-12-18 NOTE — ED PROVIDER NOTE - PATIENT PORTAL LINK FT
You can access the FollowMyHealth Patient Portal offered by [facility] by registering at the following website: [email or web address]. By joining [employer]’s FollowMyHealth portal, you will also be able to view your health information using other applications (apps) compatible with our system.

## 2021-12-18 NOTE — ED PROVIDER NOTE - CARE PROVIDERS DIRECT ADDRESSES
[email or web address]

## 2021-12-18 NOTE — ED ADULT NURSE NOTE - OBJECTIVE STATEMENT
Received pt alert and orientated. Pt was vomiting and had diarrhea yesterday. 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 Pt is able to walk and move all extremities. Call bell within reach. Freq rounding performed. Safety/Comfort maintained. Will continue to monitor. Pt denies SOB and chest pain.

## 2021-12-18 NOTE — ED PROVIDER NOTE - OBJECTIVE STATEMENT
27 yo white female with 24-hours of nausea, vomiting and non bloody watery diarrhea which began a few hours after eating Mexican food. Patient was the only one that ate their in her party that got ill. In addition to those symptoms, patient developed fever and chills along with ill defined upper abdominal crampy pain. No chest pain. No SOB. No cough. No dysuria or hematuria.

## 2021-12-29 ENCOUNTER — EMERGENCY (EMERGENCY)
Facility: HOSPITAL | Age: 26
LOS: 1 days | Discharge: HOME | End: 2021-12-29
Attending: EMERGENCY MEDICINE | Admitting: EMERGENCY MEDICINE
Payer: COMMERCIAL

## 2021-12-29 VITALS
DIASTOLIC BLOOD PRESSURE: 87 MMHG | HEART RATE: 108 BPM | RESPIRATION RATE: 16 BRPM | OXYGEN SATURATION: 97 % | HEIGHT: 63 IN | SYSTOLIC BLOOD PRESSURE: 120 MMHG | TEMPERATURE: 102 F | WEIGHT: 138.01 LBS

## 2021-12-29 VITALS
RESPIRATION RATE: 16 BRPM | HEART RATE: 95 BPM | TEMPERATURE: 101 F | OXYGEN SATURATION: 98 % | DIASTOLIC BLOOD PRESSURE: 81 MMHG | SYSTOLIC BLOOD PRESSURE: 126 MMHG

## 2021-12-29 LAB — SARS-COV-2 RNA SPEC QL NAA+PROBE: DETECTED

## 2021-12-29 PROCEDURE — 93010 ELECTROCARDIOGRAM REPORT: CPT

## 2021-12-29 PROCEDURE — 99284 EMERGENCY DEPT VISIT MOD MDM: CPT

## 2021-12-29 PROCEDURE — 99283 EMERGENCY DEPT VISIT LOW MDM: CPT

## 2021-12-29 PROCEDURE — 87635 SARS-COV-2 COVID-19 AMP PRB: CPT

## 2021-12-29 PROCEDURE — 93005 ELECTROCARDIOGRAM TRACING: CPT

## 2021-12-29 RX ORDER — ACETAMINOPHEN 500 MG
975 TABLET ORAL ONCE
Refills: 0 | Status: COMPLETED | OUTPATIENT
Start: 2021-12-29 | End: 2021-12-29

## 2021-12-29 RX ADMIN — Medication 975 MILLIGRAM(S): at 21:03

## 2021-12-29 NOTE — ED PROVIDER NOTE - OBJECTIVE STATEMENT
26 female presents to ER c/o low grade fever, sore throat, congestion, headache, fatigue, shortness of breath for 2 days, works as a  and was an exposure in class. Patient has 2 doses of covid pfizer vaccine.

## 2021-12-29 NOTE — ED PROVIDER NOTE - PATIENT PORTAL LINK FT
You can access the FollowMyHealth Patient Portal offered by [facility] by registering at the following website: [email or web address]. By joining [employer]’s FollowMyHealth portal, you will also be able to view your health information using other applications (apps) compatible with our system.

## 2021-12-29 NOTE — ED PROVIDER NOTE - NSFOLLOWUPINSTRUCTIONS_ED_ALL_ED_FT
QUARANTINE keeps someone who was in close contact with someone who has COVID-19 away from others.    Quarantine if you have been in close contact with someone who has COVID-19, unless you have been fully vaccinated.     If you are fully vaccinated     •You do NOT need to quarantine unless they have symptoms      •Get tested 3-5 days after your exposure, even if you don't have symptoms      •Wear a mask indoors in public for 14 days following exposure or until your test result is negative      If you are not fully vaccinated     •Stay home for 14 days after your last contact with a person who has COVID-19      •Watch for fever (100.4°F), cough, shortness of breath, or other symptoms of COVID-19      •If possible, stay away from people you live with, especially people who are at higher risk for getting very sick from COVID-19      •Contact your local public health department for options in your area to possibly shorten your quarantine        ISOLATION keeps someone who is sick or tested positive for COVID-19 without symptoms away from others, even in their own home.    People who are in isolation should stay home and stay in a specific "sick room" or area and use a separate bathroom (if available).     If you are sick and think or know you have COVID-19   Stay home until after   •At least 10 days since symptoms first appeared and      •At least 24 hours with no fever without the use of fever-reducing medications and      •Symptoms have improved      If you tested positive for COVID-19 but do not have symptoms     •Stay home until after 10 days have passed since your positive viral test      •If you develop symptoms after testing positive, follow the steps above for those who are sick      cdc.gov/coronavirus      09/27/2021    This information is not intended to replace advice given to you by your health care provider. Make sure you discuss any questions you have with your health care provider.      Document Revised: 11/01/2021 Document Reviewed: 11/01/2021    Elsevier Patient Education © 2021 [employer] Inc.

## 2022-01-09 ENCOUNTER — EMERGENCY (EMERGENCY)
Facility: HOSPITAL | Age: 27
LOS: 1 days | Discharge: HOME | End: 2022-01-09
Attending: EMERGENCY MEDICINE | Admitting: EMERGENCY MEDICINE
Payer: COMMERCIAL

## 2022-01-09 VITALS
OXYGEN SATURATION: 98 % | HEART RATE: 94 BPM | WEIGHT: 138.01 LBS | TEMPERATURE: 99 F | DIASTOLIC BLOOD PRESSURE: 77 MMHG | SYSTOLIC BLOOD PRESSURE: 115 MMHG | HEIGHT: 63 IN | RESPIRATION RATE: 18 BRPM

## 2022-01-09 VITALS
SYSTOLIC BLOOD PRESSURE: 105 MMHG | OXYGEN SATURATION: 99 % | DIASTOLIC BLOOD PRESSURE: 65 MMHG | RESPIRATION RATE: 18 BRPM | HEART RATE: 71 BPM | TEMPERATURE: 99 F

## 2022-01-09 LAB
ALBUMIN SERPL ELPH-MCNC: 3.7 G/DL — SIGNIFICANT CHANGE UP (ref 3.3–5)
ALP SERPL-CCNC: 53 U/L — SIGNIFICANT CHANGE UP (ref 40–120)
ALT FLD-CCNC: 37 U/L — SIGNIFICANT CHANGE UP (ref 12–78)
ANION GAP SERPL CALC-SCNC: 3 MMOL/L — LOW (ref 5–17)
AST SERPL-CCNC: 18 U/L — SIGNIFICANT CHANGE UP (ref 15–37)
BASOPHILS # BLD AUTO: 0.02 K/UL — SIGNIFICANT CHANGE UP (ref 0–0.2)
BASOPHILS NFR BLD AUTO: 0.3 % — SIGNIFICANT CHANGE UP (ref 0–2)
BILIRUB SERPL-MCNC: 0.6 MG/DL — SIGNIFICANT CHANGE UP (ref 0.2–1.2)
BUN SERPL-MCNC: 13 MG/DL — SIGNIFICANT CHANGE UP (ref 7–23)
CALCIUM SERPL-MCNC: 9.2 MG/DL — SIGNIFICANT CHANGE UP (ref 8.5–10.1)
CHLORIDE SERPL-SCNC: 107 MMOL/L — SIGNIFICANT CHANGE UP (ref 96–108)
CK MB CFR SERPL CALC: <1 NG/ML — SIGNIFICANT CHANGE UP (ref 0–3.6)
CO2 SERPL-SCNC: 28 MMOL/L — SIGNIFICANT CHANGE UP (ref 22–31)
CREAT SERPL-MCNC: 0.66 MG/DL — SIGNIFICANT CHANGE UP (ref 0.5–1.3)
D DIMER BLD IA.RAPID-MCNC: 158 NG/ML DDU — SIGNIFICANT CHANGE UP
EOSINOPHIL # BLD AUTO: 0.28 K/UL — SIGNIFICANT CHANGE UP (ref 0–0.5)
EOSINOPHIL NFR BLD AUTO: 4 % — SIGNIFICANT CHANGE UP (ref 0–6)
GLUCOSE SERPL-MCNC: 82 MG/DL — SIGNIFICANT CHANGE UP (ref 70–99)
HCG SERPL-ACNC: <1 MIU/ML — SIGNIFICANT CHANGE UP
HCT VFR BLD CALC: 38.8 % — SIGNIFICANT CHANGE UP (ref 34.5–45)
HGB BLD-MCNC: 13 G/DL — SIGNIFICANT CHANGE UP (ref 11.5–15.5)
IMM GRANULOCYTES NFR BLD AUTO: 0.3 % — SIGNIFICANT CHANGE UP (ref 0–1.5)
LYMPHOCYTES # BLD AUTO: 2.86 K/UL — SIGNIFICANT CHANGE UP (ref 1–3.3)
LYMPHOCYTES # BLD AUTO: 40.5 % — SIGNIFICANT CHANGE UP (ref 13–44)
MCHC RBC-ENTMCNC: 29.9 PG — SIGNIFICANT CHANGE UP (ref 27–34)
MCHC RBC-ENTMCNC: 33.5 GM/DL — SIGNIFICANT CHANGE UP (ref 32–36)
MCV RBC AUTO: 89.2 FL — SIGNIFICANT CHANGE UP (ref 80–100)
MONOCYTES # BLD AUTO: 0.85 K/UL — SIGNIFICANT CHANGE UP (ref 0–0.9)
MONOCYTES NFR BLD AUTO: 12 % — SIGNIFICANT CHANGE UP (ref 2–14)
NEUTROPHILS # BLD AUTO: 3.03 K/UL — SIGNIFICANT CHANGE UP (ref 1.8–7.4)
NEUTROPHILS NFR BLD AUTO: 42.9 % — LOW (ref 43–77)
NRBC # BLD: 0 /100 WBCS — SIGNIFICANT CHANGE UP (ref 0–0)
PLATELET # BLD AUTO: 254 K/UL — SIGNIFICANT CHANGE UP (ref 150–400)
POTASSIUM SERPL-MCNC: 4.5 MMOL/L — SIGNIFICANT CHANGE UP (ref 3.5–5.3)
POTASSIUM SERPL-SCNC: 4.5 MMOL/L — SIGNIFICANT CHANGE UP (ref 3.5–5.3)
PROT SERPL-MCNC: 7.4 G/DL — SIGNIFICANT CHANGE UP (ref 6–8.3)
RBC # BLD: 4.35 M/UL — SIGNIFICANT CHANGE UP (ref 3.8–5.2)
RBC # FLD: 12.9 % — SIGNIFICANT CHANGE UP (ref 10.3–14.5)
SODIUM SERPL-SCNC: 138 MMOL/L — SIGNIFICANT CHANGE UP (ref 135–145)
TROPONIN I, HIGH SENSITIVITY RESULT: 4 NG/L — SIGNIFICANT CHANGE UP
WBC # BLD: 7.06 K/UL — SIGNIFICANT CHANGE UP (ref 3.8–10.5)
WBC # FLD AUTO: 7.06 K/UL — SIGNIFICANT CHANGE UP (ref 3.8–10.5)

## 2022-01-09 PROCEDURE — 84484 ASSAY OF TROPONIN QUANT: CPT

## 2022-01-09 PROCEDURE — 85025 COMPLETE CBC W/AUTO DIFF WBC: CPT

## 2022-01-09 PROCEDURE — 99285 EMERGENCY DEPT VISIT HI MDM: CPT

## 2022-01-09 PROCEDURE — 93005 ELECTROCARDIOGRAM TRACING: CPT

## 2022-01-09 PROCEDURE — 96374 THER/PROPH/DIAG INJ IV PUSH: CPT

## 2022-01-09 PROCEDURE — 93010 ELECTROCARDIOGRAM REPORT: CPT

## 2022-01-09 PROCEDURE — 99284 EMERGENCY DEPT VISIT MOD MDM: CPT | Mod: 25

## 2022-01-09 PROCEDURE — 82553 CREATINE MB FRACTION: CPT

## 2022-01-09 PROCEDURE — 84702 CHORIONIC GONADOTROPIN TEST: CPT

## 2022-01-09 PROCEDURE — 80053 COMPREHEN METABOLIC PANEL: CPT

## 2022-01-09 PROCEDURE — 36415 COLL VENOUS BLD VENIPUNCTURE: CPT

## 2022-01-09 PROCEDURE — 71045 X-RAY EXAM CHEST 1 VIEW: CPT

## 2022-01-09 PROCEDURE — 71045 X-RAY EXAM CHEST 1 VIEW: CPT | Mod: 26

## 2022-01-09 PROCEDURE — 85379 FIBRIN DEGRADATION QUANT: CPT

## 2022-01-09 RX ORDER — KETOROLAC TROMETHAMINE 30 MG/ML
15 SYRINGE (ML) INJECTION ONCE
Refills: 0 | Status: DISCONTINUED | OUTPATIENT
Start: 2022-01-09 | End: 2022-01-09

## 2022-01-09 RX ADMIN — Medication 15 MILLIGRAM(S): at 14:30

## 2022-01-09 NOTE — ED PROVIDER NOTE - PATIENT PORTAL LINK FT
You can access the FollowMyHealth Patient Portal offered by [facility] by registering at the following website: [email or web address]. By joining [employer]’s FollowMyHealth portal, you will also be able to view your health information using other applications (apps) compatible with our system.

## 2022-01-09 NOTE — ED ADULT NURSE NOTE - CHIEF COMPLAINT QUOTE
Patient is a 25yo female was seen at [facility] and was diagnosed with Covid Patient states that she feel worse and has pain in Left lung chest pain with fevers and difficulty breathing

## 2022-01-09 NOTE — ED PROVIDER NOTE - NSFOLLOWUPINSTRUCTIONS_ED_ALL_ED_FT
take Ibuprofen for pain  follow up with pcp  return to er for any worsening symptoms    Chest Wall Pain    WHAT YOU NEED TO KNOW:    Chest wall pain may be caused by problems with the muscles, cartilage, or bones of the chest wall. Chest wall pain may also be caused by pain that spreads to your chest from another part of your body. The pain may be aching, severe, dull, or sharp. It may come and go, or it may be constant. The pain may be worse when you move in certain ways, breathe deeply, or cough.     DISCHARGE INSTRUCTIONS:    Call 911 if:   •You have any of the following signs of a heart attack: ?Squeezing, pressure, or pain in your chest      ?You may also have any of the following: ?Discomfort or pain in your back, neck, jaw, stomach, or arm      ?Shortness of breath      ?Nausea or vomiting      ?Lightheadedness or a sudden cold sweat            Return to the emergency department if:   •You have severe pain.          Contact your healthcare provider if:   •You develop a rash.       •You have other new symptoms.      •Your pain does not improve, even with treatment.      •You have questions or concerns about your condition or care.       Medicines: You may need any of the following:   •NSAIDs, such as ibuprofen, help decrease swelling, pain, and fever. This medicine is available with or without a doctor's order. NSAIDs can cause stomach bleeding or kidney problems in certain people. If you take blood thinner medicine, always ask your healthcare provider if NSAIDs are safe for you. Always read the medicine label and follow directions.      •Acetaminophen decreases pain. It is available without a doctor's order. Ask how much to take and how often to take it. Follow directions. Acetaminophen can cause liver damage if not taken correctly.      •A cream may be applied to your chest to decrease pain.       •Take your medicine as directed. Contact your healthcare provider if you think your medicine is not helping or if you have side effects. Tell him of her if you are allergic to any medicine. Keep a list of the medicines, vitamins, and herbs you take. Include the amounts, and when and why you take them. Bring the list or the pill bottles to follow-up visits. Carry your medicine list with you in case of an emergency.      Follow up with your healthcare provider as directed: Write down your questions so you remember to ask them during your visits.     Self-care:   •Rest as needed. Avoid activities that make your chest wall pain worse.      •Apply heat on your chest for 20 to 30 minutes every 2 hours for as many days as directed. Heat helps decrease pain and muscle spasms.      •Apply ice on your chest for 15 to 20 minutes every hour or as directed. Use an ice pack, or put crushed ice in a plastic bag. Cover it with a towel. Ice helps prevent tissue damage and decreases swelling and pain.

## 2022-01-09 NOTE — ED PROVIDER NOTE - OBJECTIVE STATEMENT
Pt is a 27 yo female with no pmhx covid positive day 12 here for cough left side chest pain with cough sob pain not worse with deep breath worse with laying on left side denies any fevers no nvd no abdominal pain no ocp use no leg swelling no recent travels.

## 2022-01-09 NOTE — ED ADULT NURSE NOTE - OBJECTIVE STATEMENT
patient came in ED from home with c/o left sided chest wall discomfort worst with coughing. alert and oriented X 4. afebrile. diagnosed in this Hospital with Covid19 last week. non-labored respiration noted. lungs clear and equal on auscultation. abdomen nondistended, nontender. EKG done and reviewed by ER PA and MD.

## 2022-01-09 NOTE — ED ADULT TRIAGE NOTE - CHIEF COMPLAINT QUOTE
Patient is a 27yo female was seen at [facility] and was diagnosed with Covid Patient states that she feel worse and has pain in Left lung chest pain with fevers and difficulty breathing

## 2022-01-09 NOTE — ED PROVIDER NOTE - ATTENDING CONTRIBUTION TO CARE
25 yo white female Dx with Covid last week here with few days of dry cough with associated left sided chest pain, increased with cough but not with deep inspiration. No fever or chills. Exam revealed white female in NAD with clear lungs and normal heart sounds. Mild reproducible tenderness to palpation chest wall. I agree with plan and management outlined by PA.

## 2022-01-12 ENCOUNTER — EMERGENCY (EMERGENCY)
Facility: HOSPITAL | Age: 27
LOS: 1 days | Discharge: HOME | End: 2022-01-12
Attending: EMERGENCY MEDICINE | Admitting: EMERGENCY MEDICINE
Payer: COMMERCIAL

## 2022-01-12 VITALS
RESPIRATION RATE: 18 BRPM | DIASTOLIC BLOOD PRESSURE: 77 MMHG | OXYGEN SATURATION: 100 % | TEMPERATURE: 99 F | HEART RATE: 89 BPM | WEIGHT: 132.28 LBS | HEIGHT: 63 IN | SYSTOLIC BLOOD PRESSURE: 118 MMHG

## 2022-01-12 VITALS
RESPIRATION RATE: 17 BRPM | DIASTOLIC BLOOD PRESSURE: 77 MMHG | TEMPERATURE: 99 F | OXYGEN SATURATION: 100 % | SYSTOLIC BLOOD PRESSURE: 121 MMHG | HEART RATE: 88 BPM

## 2022-01-12 PROCEDURE — 99283 EMERGENCY DEPT VISIT LOW MDM: CPT

## 2022-01-12 RX ORDER — ALBUTEROL 90 UG/1
2 AEROSOL, METERED ORAL
Qty: 1 | Refills: 0
Start: 2022-01-12

## 2022-01-12 NOTE — ED ADULT NURSE NOTE - CHIEF COMPLAINT QUOTE
pt c/o cough and chest congestion took a PCR text today and cane back negative but was positive on new years gio

## 2022-01-12 NOTE — ED ADULT NURSE NOTE - NS ED NURSE LEVEL OF CONSCIOUSNESS ORIENTATION
PAST MEDICAL HISTORY:  No pertinent past medical history      Oriented - self; Oriented - place; Oriented - time

## 2022-01-12 NOTE — ED ADULT TRIAGE NOTE - CHIEF COMPLAINT QUOTE
pt c/o cough and cgest congestion took a PCR text today and cane back negative but was positive on new years gio

## 2022-01-12 NOTE — ED ADULT NURSE NOTE - CAS EDN DISCHARGE ASSESSMENT
Chief Complaint: Follow up for Secondary Hypogonadism     HPI:     Neil Abdi Jr. is a 53 y.o. male here for follow up of Hypogonadism       He was previously seen by another provider this is my first time meeting him      Since last visit patient reports feeling excellent.    He remains on Testosterone cypionate 125 mg every 7 days which has been his dose for the past 12 months.   He reports excellent compliance and denies missing any weekly or biweekly doses.       He currently denies fatigue, depression, loss of libido, erectile dysfunction.    He currently denies decreased stream, hesitancy, intermittency and post void dribbling.      Last total testosterone was 643 with a calculated free testosterone of 20 hematocrit was 52% he gets regular phlebotomy every 8 to 12 weeks at [facility]    His iron levels are normal ferritin is 110, LDL cholesterol   Is serum creatinine is slightly elevated 1.4  AST is 47  ALT is 64    He has a history of vitamin D and B12 deficiency but we do not have updated levels on hand        Patient's medications, allergies, and social histories were reviewed and updated as appropriate.      ROS:       CONS:     No fever, no chills   EYES:     No diplopia, no blurry vision   CV:           No chest pain, no palpitations   PULM:     No SOB, no cough, no hemoptysis.   GI:            No nausea, no vomiting, no diarrhea, no constipation   ENDO:     No polyuria, no polydipsia, no heat intolerance, no cold intolerance       Past Medical History:  Problem List:  2021-02: Anxiety  2020-09: Hypogonadism in male  2018-03: Lump in neck  2017-03: Acne vulgaris  2017-03: Chronic gout involving toe of left foot without tophus  2017-03: Mixed hyperlipidemia  2017-02: Essential hypertension  2017-02: Gastroesophageal reflux disease without esophagitis  2017-02: Multiple allergies  2017-02: Low testosterone in male  2017-02: Exercise-induced asthma      Past Surgical History:  Past Surgical History:  "  Procedure Laterality Date   • LAMINOTOMY          Allergies:  Patient has no known allergies.     Social History:  Social History     Tobacco Use   • Smoking status: Former Smoker     Quit date: 2002     Years since quittin.8   • Smokeless tobacco: Former User   Substance Use Topics   • Alcohol use: Yes     Alcohol/week: 1.8 oz     Types: 1 Glasses of wine, 1 Cans of beer, 1 Shots of liquor per week     Comment: weekly   • Drug use: No        Family History:   family history includes Cancer in his father, maternal grandfather, mother, paternal grandfather, and paternal uncle; No Known Problems in his sister, sister, and sister.        PHYSICAL EXAM:   Vital signs: /72   Pulse 87   Ht 1.753 m (5' 9\")   Wt 90.7 kg (200 lb)   SpO2 95%   BMI 29.53 kg/m²   GENERAL: Well-developed, well-nourished in no apparent distress.   EYE:  No ocular asymmetry, PERRLA  HENT: Pink, moist mucous membranes.     CHEST: no gynecomastia  CARDIOVASCULAR:  No murmurs  LUNGS: Clear breath sounds  ABDOMEN: Soft, nontender   GENIT: deferred  EXTREMITIES: No clubbing, cyanosis, or edema.   NEUROLOGICAL: No gross focal motor abnormalities. No visible tremor, no proximal muscle weakness   LYMPH: No cervical adenopathy palpated.   SKIN: No rashes, lesions.       Labs:  Lab Results   Component Value Date/Time    WBC 7.7 2020 09:20 AM    RBC 6.14 (H) 2020 09:20 AM    HEMOGLOBIN 17.2 2021 04:30 PM    MCV 86.6 2020 09:20 AM    MCH 28.7 2020 09:20 AM    MCHC 33.1 (L) 2020 09:20 AM    RDW 43.7 2020 09:20 AM    MPV 10.7 2020 09:20 AM       Lab Results   Component Value Date/Time    SODIUM 141 2021 04:30 PM    POTASSIUM 4.0 2021 04:30 PM    CHLORIDE 107 2021 04:30 PM    CO2 21 2021 04:30 PM    ANION 13.0 2021 04:30 PM    GLUCOSE 99 2021 04:30 PM    BUN 20 2021 04:30 PM    CREATININE 1.44 (H) 2021 04:30 PM    CALCIUM 9.0 2021 04:30 PM "    ASTSGOT 47 (H) 06/02/2021 04:30 PM    ALTSGPT 64 (H) 06/02/2021 04:30 PM    TBILIRUBIN 0.5 06/02/2021 04:30 PM    ALBUMIN 4.5 06/02/2021 04:30 PM    TOTPROTEIN 7.4 06/02/2021 04:30 PM    GLOBULIN 2.9 06/02/2021 04:30 PM    AGRATIO 1.6 06/02/2021 04:30 PM       No results found for: LH    Lab Results   Component Value Date/Time    FSH 0.5 (L) 03/07/2018 0628       Lab Results   Component Value Date/Time    TESTOSTERONE 643 06/02/2021 1630           Imaging:    ASSESSMENT/PLAN:      1. Hypogonadism in male  Controlled  Testosterone levels were discussed with the patient today  Continue current doses  Continue phlebotomy as discussed  Follow-up in 6 months a repeat of testosterone and hematocrit levels    2. Erectile dysfunction, unspecified erectile dysfunction type  Controlled  He reports control of this symptom with androgen replacement therapy  He is currently not requiring any phosphodiesterase inhibitors    3. High risk medication use  Patient is taking testosterone which is a high risk medication    4. Vitamin D deficiency  Vitamin D levels were previously at the upper limit of normal  He is now taking vitamin D3 5000 units every other day we will check B12 levels with next labs    6. B12 deficiency  B12 levels were previously elevated he reports that he adjusted his dosing to every other day we will check B12 levels      Return in about 6 months (around 12/18/2021).      Thank you kindly for allowing me to participate in the endocrine care plan for this patient.    Shen Boyle MD, FACE, ECNU  06/18/21    CC:   Oswald Stinson, [email or web address]     Alert and oriented to person, place and time/Awake

## 2022-01-12 NOTE — ED PROVIDER NOTE - PATIENT PORTAL LINK FT
You can access the FollowMyHealth Patient Portal offered by [facility] by registering at the following website: [email or web address]. By joining [employer]’s FollowMyHealth portal, you will also be able to view your health information using other applications (apps) compatible with our system.

## 2022-01-12 NOTE — ED PROVIDER NOTE - OBJECTIVE STATEMENT
27 y/o F + Covid with increased cough today with low grade fevers.  pt was in the ED 3 days ago with similar symptoms.  pt went back to work today as a .

## 2022-01-12 NOTE — ED PROVIDER NOTE - NSFOLLOWUPINSTRUCTIONS_ED_ALL_ED_FT
Acute Cough    WHAT YOU NEED TO KNOW:    What is an acute cough? An acute cough can last up to 3 weeks. Common causes of an acute cough include a cold, allergies, or a lung infection.    How is the cause of an acute cough diagnosed? Your healthcare provider will examine you and listen to your lungs. Tell your healthcare provider if you cough up any mucus, or have a fever or shortness of breath. Also tell your provider what makes the cough better or worse. Depending on your symptoms, you may need a chest x-ray. A sample of mucus may be collected and tested for infection.    How is an acute cough treated? An acute cough usually goes away on its own. Ask your healthcare provider about medicines you can take to decrease your cough. You may need medicine to stop the cough, decrease swelling in your airways, or help open your airways. Medicine may also be given to help you cough up mucus. If you have an infection caused by bacteria, you may need antibiotics.    What can I do to manage my cough?   •Do not smoke and stay away from others who smoke. Nicotine and other chemicals in cigarettes and cigars can cause lung damage and make your cough worse. Ask your healthcare provider for information if you currently smoke and need help to quit. E-cigarettes or smokeless tobacco still contain nicotine. Talk to your healthcare provider before you use these products.      •Drink extra liquids as directed. Liquids will help thin and loosen mucus so you can cough it up. Liquids will also help prevent dehydration. Examples of good liquids to drink include water, fruit juice, and broth. Do not drink liquids that contain caffeine. Caffeine can increase your risk for dehydration. Ask your healthcare provider how much liquid to drink each day.       •Rest as directed. Do not do activities that make your cough worse, such as exercise.      •Use a humidifier or vaporizer. Use a cool mist humidifier or a vaporizer to increase air moisture in your home. This may make it easier for you to breathe and help decrease your cough.      •Eat 2 to 5 mL of honey 2 times each day. Honey can help thin mucus and decrease your cough.      •Use cough drops or lozenges. These can help decrease throat irritation and your cough.      When should I seek immediate care?   •You have trouble breathing or feel short of breath.      •You cough up blood, or you see blood in your mucus.      •You faint or feel weak or dizzy.      •You have chest pain when you cough or take a deep breath.      •You have new wheezing.      When should I contact my healthcare provider?   •You have a fever.      •Your cough lasts longer than 4 weeks.      •Your symptoms do not improve with treatment.      •You have questions or concerns about your condition or care.      CARE AGREEMENT:    You have the right to help plan your care. Learn about your health condition and how it may be treated. Discuss treatment options with your healthcare providers to decide what care you want to receive. You always have the right to refuse treatment.

## 2022-01-17 ENCOUNTER — EMERGENCY (EMERGENCY)
Facility: HOSPITAL | Age: 27
LOS: 1 days | Discharge: HOME | End: 2022-01-17
Attending: EMERGENCY MEDICINE | Admitting: EMERGENCY MEDICINE
Payer: COMMERCIAL

## 2022-01-17 VITALS
HEIGHT: 63 IN | OXYGEN SATURATION: 98 % | DIASTOLIC BLOOD PRESSURE: 80 MMHG | SYSTOLIC BLOOD PRESSURE: 116 MMHG | WEIGHT: 138.01 LBS | RESPIRATION RATE: 16 BRPM | HEART RATE: 69 BPM | TEMPERATURE: 98 F

## 2022-01-17 LAB
FLUAV AG NPH QL: SIGNIFICANT CHANGE UP
FLUBV AG NPH QL: SIGNIFICANT CHANGE UP
HCG UR QL: NEGATIVE — SIGNIFICANT CHANGE UP
RSV RNA NPH QL NAA+NON-PROBE: SIGNIFICANT CHANGE UP
SARS-COV-2 RNA SPEC QL NAA+PROBE: SIGNIFICANT CHANGE UP

## 2022-01-17 PROCEDURE — 99283 EMERGENCY DEPT VISIT LOW MDM: CPT | Mod: 25

## 2022-01-17 PROCEDURE — 87637 SARSCOV2&INF A&B&RSV AMP PRB: CPT

## 2022-01-17 PROCEDURE — 99284 EMERGENCY DEPT VISIT MOD MDM: CPT

## 2022-01-17 PROCEDURE — 71046 X-RAY EXAM CHEST 2 VIEWS: CPT

## 2022-01-17 PROCEDURE — 71046 X-RAY EXAM CHEST 2 VIEWS: CPT | Mod: 26

## 2022-01-17 PROCEDURE — 81025 URINE PREGNANCY TEST: CPT

## 2022-01-17 RX ORDER — HYDROCODONE BITARTRATE AND HOMATROPINE METHYLBROMIDE 5; 1.5 MG/5ML; MG/5ML
5 SOLUTION ORAL
Qty: 25 | Refills: 0
Start: 2022-01-17 | End: 2022-01-21

## 2022-01-17 NOTE — ED PROVIDER NOTE - NSFOLLOWUPINSTRUCTIONS_ED_ALL_ED_FT
Follow up with your PMD and pulmonologist as discussed for re-evaluation, ongoing care and treatment. Rest, drink plenty of fluids, take cough medication as prescribed. Continue using inhaler as needed. If having worsening of symptoms, shortness of breath or other related symptoms, RETURN TO THE ER IMMEDIATELY.     Acute Cough    WHAT YOU NEED TO KNOW:    An acute cough can last up to 3 weeks. Common causes of an acute cough include a cold, allergies, or a lung infection.    DISCHARGE INSTRUCTIONS:    Return to the emergency department if:   •You have trouble breathing or feel short of breath.      •You cough up blood, or you see blood in your mucus.      •You faint or feel weak or dizzy.      •You have chest pain when you cough or take a deep breath.      •You have new wheezing.      Contact your healthcare provider if:   •You have a fever.      •Your cough lasts longer than 4 weeks.      •Your symptoms do not improve with treatment.      •You have questions or concerns about your condition or care.      Medicines:   •Medicines may be needed to stop the cough, decrease swelling in your airways, or help open your airways. Medicine may also be given to help you cough up mucus. Ask your healthcare provider what over-the-counter medicines you can take. If you have an infection caused by bacteria, you may need antibiotics.      •Take your medicine as directed. Contact your healthcare provider if you think your medicine is not helping or if you have side effects. Tell him or her if you are allergic to any medicine. Keep a list of the medicines, vitamins, and herbs you take. Include the amounts, and when and why you take them. Bring the list or the pill bottles to follow-up visits. Carry your medicine list with you in case of an emergency.      Manage your symptoms:   •Do not smoke and stay away from others who smoke. Nicotine and other chemicals in cigarettes and cigars can cause lung damage and make your cough worse. Ask your healthcare provider for information if you currently smoke and need help to quit. E-cigarettes or smokeless tobacco still contain nicotine. Talk to your healthcare provider before you use these products.      •Drink extra liquids as directed. Liquids will help thin and loosen mucus so you can cough it up. Liquids will also help prevent dehydration. Examples of good liquids to drink include water, fruit juice, and broth. Do not drink liquids that contain caffeine. Caffeine can increase your risk for dehydration. Ask your healthcare provider how much liquid to drink each day.      •Rest as directed. Do not do activities that make your cough worse, such as exercise.      •Use a humidifier or vaporizer. Use a cool mist humidifier or a vaporizer to increase air moisture in your home. This may make it easier for you to breathe and help decrease your cough.      •Eat 2 to 5 mL of honey 2 times each day. Honey can help thin mucus and decrease your cough.      •Use cough drops or lozenges. These can help decrease throat irritation and your cough.      Follow up with your healthcare provider as directed: Write down your questions so you remember to ask them during your visits.

## 2022-01-17 NOTE — ED PROVIDER NOTE - CLINICAL SUMMARY MEDICAL DECISION MAKING FREE TEXT BOX
27 y/o F with no pmhx presents with c/o cough x 1.5 weeks. States that she has dry Pt states that she had covid during the last week of 12/2021. States that she was seen here twice in the past 2 weeks for cough, had neg labs/CXR/ddimer on 01/09/2022, seen again on 01/12 for same, was prescribed tessalon perles and inhaler. States that she developed rash to trunk after taking tessalon and stopped it, has been taking inhaler without much relief. States that she is a  and kids are always coughing. States that she has appt with her PCP on 01/20. Denies SOB, CP, fever, calf pain/swelling, smoking, ocp use. PE: as above A/P: viral URI, will get CXR r/o pna, flu/covid swab as per pt request

## 2022-01-17 NOTE — ED PROVIDER NOTE - CARE PROVIDER_API CALL
Jarrett Cai  INTERNAL MEDICINE  [facility], [address]  [place] [address]  Phone: [phone]  Fax: [phone]  Follow Up Time: 1-3 Days    YOUR PMD,   Phone: (   )    -  Fax: (   )    -  Follow Up Time: 1-3 Days

## 2022-01-17 NOTE — ED PROVIDER NOTE - OBJECTIVE STATEMENT
27 y/o F with no pmhx presents with c/o cough x 1.5 weeks. States that she has dry Pt states that she had covid during the last week of 12/2021. States that she was seen here twice in the past 2 weeks for cough, had neg labs/CXR/ddimer on 01/09/2022, seen again on 01/12 for same, was prescribed tessalon perles and inhaler. States that she developed rash to trunk after taking tessalon and stopped it, has been taking inhaler without much relief. States that she is a  and kids are always coughing. States that she had neg covid swab on 01/15. States that she has appt with her PCP on 01/20. Denies SOB, CP, fever, calf pain/swelling, smoking, ocp use.

## 2022-01-17 NOTE — ED PROVIDER NOTE - PROGRESS NOTE DETAILS
Reevaluated patient at bedside. Discussed the results of all diagnostic testing in ED and copies of all reports given.  Advised will rx hycodan, f/u pulm and pcp. An opportunity to ask questions was given.  Discussed the importance of prompt, close medical follow-up.  Patient will return with any changes, concerns or persistent / worsening symptoms.  Understanding of all instructions verbalized. Spoke with pharmacist at [facility], advised that hycodan is on backorder, pt was instead prescribed promethazine 5ml Q6H Prn x 3d, max 30ml in 24 hours. Reevaluated patient at bedside. Discussed the results of all diagnostic testing in ED and copies of all reports given.  Advised will rx hycodan, f/u pulm and pcp on 01/20. An opportunity to ask questions was given.  Discussed the importance of prompt, close medical follow-up.  Patient will return with any changes, concerns or persistent / worsening symptoms.  Understanding of all instructions verbalized.

## 2022-01-17 NOTE — ED PROVIDER NOTE - ATTENDING CONTRIBUTION TO CARE
25 y/o F with 4 recent ED visits for cough.  + Covid on 1/9.  pt c/o persistent cough despite ventolin, tessalon Perles.  xray neg for PNA vital nml    PE: unremarkable    cough suppressant, pulm fu

## 2022-01-17 NOTE — ED PROVIDER NOTE - PATIENT PORTAL LINK FT
You can access the FollowMyHealth Patient Portal offered by [facility] by registering at the following website: [email or web address]. By joining [employer]’s FollowMyHealth portal, you will also be able to view your health information using other applications (apps) compatible with our system.

## 2022-01-17 NOTE — ED ADULT NURSE NOTE - NSFALLRSKPASTHIST_ED_ALL_ED
Pt is an 82 y/o male with hx of COPD, CHF s/p AICD, HTN, DLD, presents to ED for right prosthetic hip dislocation. Right hip pain is moderate, constant, worse with movement. PTA pt bent over at home and felt hip come out. Pt did not fall, no other injury. Pt with one prior dislocation 2 years ago. Prosthesis was placed > 10 years ago.    # Rt hip prosthetic dislocation-- patient did not have a fall. Failed closed reduction under propofol. now s/pclosed reduction in OR under general anesthesia.    # Moderate to severe copd-- former smoker-- was admitted in ICU in oct 2018-- for exacerbation and is now stable.    # hx of pemphigus-on po prednisone at home for it.    # Chr systolic CHF s/p AICD-- on carvedilol and losartan. Family does not know recent reports of echo.   He saw cardiologist several yrs ago and is waiting to see one soon.    # hx of CAD-- s/p PCI 10yrs ago-- on and off on aspirin.  Can walk 2-3 blocks before getting SOB        # uncontrolled BP-- likely due to pain-- restarted on home meds maximum doses of carvedilol and losartan       orthopedics has said  WBAT as tolerated   Maintain Knee Immobilizer while ambulating and abduction pillow in bed  DC plan when is out of bed and ambulating.  PT eval is still pending-- requested yesterday no

## 2022-01-17 NOTE — ED ADULT NURSE NOTE - OBJECTIVE STATEMENT
25 y/o female receive din the ER 25 y/o female receive din the ER, c/o dry cough with lots of saliva and on and fever for 1.5 weeks. pt denies any dizziness, chills, abdominal pain, chest pain, SOB. Pt was evaluated by provider, pending x-ray and covid results. Will continue to monitor.

## 2022-01-17 NOTE — ED PROVIDER NOTE - PROVIDER TOKENS
PROVIDER:[TOKEN:[[id number]],FOLLOWUP:[1-3 Days]],FREE:[LAST:[YOUR PMD],PHONE:[(   )    -],FAX:[(   )    -],FOLLOWUP:[1-3 Days]]

## 2022-05-18 NOTE — ED PROVIDER NOTE - INTERPRETATION
[employer]   6 month continuation   Subject# [id number]    Procedures performed:   HADS-D  NIHSS  SSQOL  MOCA   Clinical management and med adherence  [place] Sleepiness Scale   PROMIS   [id number]- unblinded  was not available    Vitals: 98/64, 95/64, 9566   NSR with sinus arrhythmia

## 2022-08-12 NOTE — ED PROVIDER NOTE - WR INTERPRETATION DATE TIME  1
67M uncontrolled DM2 (HbA1c 9.2%) c/b peripheral neuropathy, HLD, left 5th toe amputation (2016) presents for suspected R great toe osteomyelitis     Diabetic ulcer of toe.   - Infected diabetic ulcer of great toe with x-ray concerning for OM, recent cx w/ MSSA, Proteus, Enterococcus all sensitive to zosyn   - MRI 8/10, confirms OM  - podiatry following   - s/p vanco, c/w zosyn (8/8-8/13), given s/p OR w/ low concern for residual infection will dc abx  - MRSA screen neg  - wound culture: staph, not MRSA   - pain control  - s/p R foot partial first ray resection on 8/12, prelim bone margin NG (not yet 48 hours exactly)  - bigeminy post op on monitor, no sx, cannot BB given low HR at baseline  - per pod now now NWB in RLE  -dc planning, discussed with SW need to confirm if meds are covered by insurance.    IDA (acute kidney injury).   - Cr 0.97 now Cr noted to be 1.69, presumed pre-renal  - downtrending   - monitor, renally dose meds, avoid nephrotoxins.    Type 2 diabetes mellitus.   - poorly controlled w/ Hb A1c 9.2%, oddly on Levemir 15 units in AM, 20 in PM  - c/w 10 am and 15 units in pm  - c/w 2 units w/ meals   - c/w DM diet, BUD  - RD consult  - started on statin ()  - BP above goal, losartan 25 mg.    Hyperlipidemia.   - c/w statin.    Need for prophylactic measure.   -SQH        On _________ , discussed with  __________ , patient is medically cleared and optimized for discharge today to _____________ . All medications were reviewed with attending, and any new/required prescriptions were sent to mutually agreed upon pharmacy.       67M uncontrolled DM2 (HbA1c 9.2%) c/b peripheral neuropathy, HLD, left 5th toe amputation (2016) presents for suspected R great toe osteomyelitis     Diabetic ulcer of toe.   - Infected diabetic ulcer of great toe with x-ray concerning for OM, recent cx w/ MSSA, Proteus, Enterococcus all sensitive to zosyn   - MRI 8/10, confirms OM  - podiatry following   - s/p vanco, c/w zosyn (8/8-8/13), given s/p OR w/ low concern for residual infection will dc abx  - MRSA screen neg  - wound culture: staph, not MRSA   - pain control  - s/p R foot partial first ray resection on 8/12, prelim bone margin NG (not yet 48 hours exactly)  - bigeminy post op on monitor, no sx, cannot BB given low HR at baseline  - per pod now non-weightbearing with crutches and a CAM boot for RLE  - DC planning, discussed with SW need to confirm if meds are covered by insurance.    IDA (acute kidney injury).   - Cr 0.90 now, previously   - Cr noted to be 1.69, presumed pre-renal  - downtrending   - monitor, renally dose meds, avoid nephrotoxins.    Type 2 diabetes mellitus.   - poorly controlled w/ Hb A1c 9.2%, oddly on Levemir 15 units in AM, 20 in PM  - c/w 10 am and 15 units in pm  - c/w 2 units w/ meals   - c/w DM diet, BUD  - RD consult  - started on statin ()  - BP above goal, losartan 25 mg.    Hyperlipidemia.   - c/w statin.    Need for prophylactic measure.   -SQH    Losartan and statin prescription sent to [facility] and covered (no copay), despite pt claims of insurance not covering his medications.  Discussed with patient regarding needs for diabetes medication and supplies, pt confirms he has enough Levemir at home (3 or 4 more pens) and has all his supplies and does not need any prescriptions    On 8/15/2022, discussed with Dr. Maldonado, patient is medically cleared and optimized for discharge today to home. All medications were reviewed with attending, and any new/required prescriptions were sent to mutually agreed upon pharmacy.       67M uncontrolled DM2 (HbA1c 9.2%) c/b peripheral neuropathy, HLD, left 5th toe amputation (2016) presents for suspected R great toe osteomyelitis     Diabetic ulcer of toe.   - Infected diabetic ulcer of great toe with x-ray concerning for OM, recent cx w/ MSSA, Proteus, Enterococcus all sensitive to zosyn   - MRI 8/10, confirms OM  - podiatry following   - s/p vanco, c/w zosyn (8/8-8/13), given s/p OR w/ low concern for residual infection will dc abx  - MRSA screen neg  - wound culture: staph, not MRSA   - pain control  - s/p R foot partial first ray resection on 8/12, prelim bone margin NG (not yet 48 hours exactly)  - bigeminy post op on monitor, no sx, cannot BB given low HR at baseline  - per pod now non-weightbearing with crutches and a CAM boot for RLE  - DC planning, discussed with SW need to confirm if meds are covered by insurance.  -confirmed that meds are covered by his insurance, meds given through [facility]    IDA (acute kidney injury).   - Cr 0.90 now, previously   - Cr noted to be 1.69, presumed pre-renal  - downtrending   - monitor, renally dose meds, avoid nephrotoxins.    Type 2 diabetes mellitus.   - poorly controlled w/ Hb A1c 9.2%, oddly on Levemir 15 units in AM, 20 in PM  - c/w 10 am and 15 units in pm  - c/w 2 units w/ meals   - c/w DM diet, BUD  - RD consult  - started on statin ()  - BP above goal, losartan 25 mg.    Hyperlipidemia.   - c/w statin.    Need for prophylactic measure.   -SQH    Losartan and statin prescription sent to [facility] and covered (no copay), despite pt claims of insurance not covering his medications.  Discussed with patient regarding needs for diabetes medication and supplies, pt confirms he has enough Levemir at home (3 or 4 more pens) and has all his supplies and does not need any prescriptions    On 8/15/2022, discussed with Dr. Maldonado, patient is medically cleared and optimized for discharge today to home. All medications were reviewed with attending, and any new/required prescriptions were sent to mutually agreed upon pharmacy.       09-Jan-2022 15:38

## 2022-12-18 ENCOUNTER — EMERGENCY (EMERGENCY)
Facility: HOSPITAL | Age: 27
LOS: 1 days | Discharge: HOME | End: 2022-12-18
Attending: EMERGENCY MEDICINE | Admitting: EMERGENCY MEDICINE
Payer: COMMERCIAL

## 2022-12-18 VITALS
TEMPERATURE: 99 F | OXYGEN SATURATION: 99 % | DIASTOLIC BLOOD PRESSURE: 77 MMHG | HEIGHT: 63 IN | RESPIRATION RATE: 18 BRPM | WEIGHT: 139.99 LBS | HEART RATE: 72 BPM | SYSTOLIC BLOOD PRESSURE: 120 MMHG

## 2022-12-18 VITALS
SYSTOLIC BLOOD PRESSURE: 100 MMHG | DIASTOLIC BLOOD PRESSURE: 67 MMHG | OXYGEN SATURATION: 98 % | HEART RATE: 76 BPM | TEMPERATURE: 98 F | RESPIRATION RATE: 16 BRPM

## 2022-12-18 LAB
ALBUMIN SERPL ELPH-MCNC: 3.4 G/DL — SIGNIFICANT CHANGE UP (ref 3.3–5)
ALP SERPL-CCNC: 48 U/L — SIGNIFICANT CHANGE UP (ref 40–120)
ALT FLD-CCNC: 17 U/L — SIGNIFICANT CHANGE UP (ref 12–78)
ANION GAP SERPL CALC-SCNC: 5 MMOL/L — SIGNIFICANT CHANGE UP (ref 5–17)
AST SERPL-CCNC: 13 U/L — LOW (ref 15–37)
BASOPHILS # BLD AUTO: 0.03 K/UL — SIGNIFICANT CHANGE UP (ref 0–0.2)
BASOPHILS NFR BLD AUTO: 0.3 % — SIGNIFICANT CHANGE UP (ref 0–2)
BILIRUB SERPL-MCNC: 0.3 MG/DL — SIGNIFICANT CHANGE UP (ref 0.2–1.2)
BUN SERPL-MCNC: 12 MG/DL — SIGNIFICANT CHANGE UP (ref 7–23)
CALCIUM SERPL-MCNC: 8.4 MG/DL — LOW (ref 8.5–10.1)
CHLORIDE SERPL-SCNC: 112 MMOL/L — HIGH (ref 96–108)
CO2 SERPL-SCNC: 26 MMOL/L — SIGNIFICANT CHANGE UP (ref 22–31)
CREAT SERPL-MCNC: 0.77 MG/DL — SIGNIFICANT CHANGE UP (ref 0.5–1.3)
EGFR: 110 ML/MIN/1.73M2 — SIGNIFICANT CHANGE UP
EOSINOPHIL # BLD AUTO: 0.21 K/UL — SIGNIFICANT CHANGE UP (ref 0–0.5)
EOSINOPHIL NFR BLD AUTO: 2.3 % — SIGNIFICANT CHANGE UP (ref 0–6)
FLUAV AG NPH QL: SIGNIFICANT CHANGE UP
FLUBV AG NPH QL: SIGNIFICANT CHANGE UP
GLUCOSE SERPL-MCNC: 91 MG/DL — SIGNIFICANT CHANGE UP (ref 70–99)
HCG SERPL-ACNC: <1 MIU/ML — SIGNIFICANT CHANGE UP
HCT VFR BLD CALC: 37.7 % — SIGNIFICANT CHANGE UP (ref 34.5–45)
HGB BLD-MCNC: 12.2 G/DL — SIGNIFICANT CHANGE UP (ref 11.5–15.5)
IMM GRANULOCYTES NFR BLD AUTO: 0.1 % — SIGNIFICANT CHANGE UP (ref 0–0.9)
LYMPHOCYTES # BLD AUTO: 4.08 K/UL — HIGH (ref 1–3.3)
LYMPHOCYTES # BLD AUTO: 44 % — SIGNIFICANT CHANGE UP (ref 13–44)
MCHC RBC-ENTMCNC: 30 PG — SIGNIFICANT CHANGE UP (ref 27–34)
MCHC RBC-ENTMCNC: 32.4 GM/DL — SIGNIFICANT CHANGE UP (ref 32–36)
MCV RBC AUTO: 92.6 FL — SIGNIFICANT CHANGE UP (ref 80–100)
MONOCYTES # BLD AUTO: 0.88 K/UL — SIGNIFICANT CHANGE UP (ref 0–0.9)
MONOCYTES NFR BLD AUTO: 9.5 % — SIGNIFICANT CHANGE UP (ref 2–14)
NEUTROPHILS # BLD AUTO: 4.06 K/UL — SIGNIFICANT CHANGE UP (ref 1.8–7.4)
NEUTROPHILS NFR BLD AUTO: 43.8 % — SIGNIFICANT CHANGE UP (ref 43–77)
NRBC # BLD: 0 /100 WBCS — SIGNIFICANT CHANGE UP (ref 0–0)
PLATELET # BLD AUTO: 237 K/UL — SIGNIFICANT CHANGE UP (ref 150–400)
POTASSIUM SERPL-MCNC: 3.8 MMOL/L — SIGNIFICANT CHANGE UP (ref 3.5–5.3)
POTASSIUM SERPL-SCNC: 3.8 MMOL/L — SIGNIFICANT CHANGE UP (ref 3.5–5.3)
PROT SERPL-MCNC: 6.9 G/DL — SIGNIFICANT CHANGE UP (ref 6–8.3)
RBC # BLD: 4.07 M/UL — SIGNIFICANT CHANGE UP (ref 3.8–5.2)
RBC # FLD: 13.1 % — SIGNIFICANT CHANGE UP (ref 10.3–14.5)
RSV RNA NPH QL NAA+NON-PROBE: SIGNIFICANT CHANGE UP
SARS-COV-2 RNA SPEC QL NAA+PROBE: SIGNIFICANT CHANGE UP
SODIUM SERPL-SCNC: 143 MMOL/L — SIGNIFICANT CHANGE UP (ref 135–145)
WBC # BLD: 9.27 K/UL — SIGNIFICANT CHANGE UP (ref 3.8–10.5)
WBC # FLD AUTO: 9.27 K/UL — SIGNIFICANT CHANGE UP (ref 3.8–10.5)

## 2022-12-18 PROCEDURE — 84702 CHORIONIC GONADOTROPIN TEST: CPT

## 2022-12-18 PROCEDURE — 96375 TX/PRO/DX INJ NEW DRUG ADDON: CPT

## 2022-12-18 PROCEDURE — 80053 COMPREHEN METABOLIC PANEL: CPT

## 2022-12-18 PROCEDURE — 99284 EMERGENCY DEPT VISIT MOD MDM: CPT

## 2022-12-18 PROCEDURE — 36415 COLL VENOUS BLD VENIPUNCTURE: CPT

## 2022-12-18 PROCEDURE — 87637 SARSCOV2&INF A&B&RSV AMP PRB: CPT

## 2022-12-18 PROCEDURE — 85025 COMPLETE CBC W/AUTO DIFF WBC: CPT

## 2022-12-18 PROCEDURE — 96374 THER/PROPH/DIAG INJ IV PUSH: CPT

## 2022-12-18 PROCEDURE — 99284 EMERGENCY DEPT VISIT MOD MDM: CPT | Mod: 25

## 2022-12-18 RX ORDER — DIPHENHYDRAMINE HCL 50 MG
25 CAPSULE ORAL ONCE
Refills: 0 | Status: COMPLETED | OUTPATIENT
Start: 2022-12-18 | End: 2022-12-18

## 2022-12-18 RX ORDER — SODIUM CHLORIDE 9 MG/ML
1000 INJECTION INTRAMUSCULAR; INTRAVENOUS; SUBCUTANEOUS ONCE
Refills: 0 | Status: COMPLETED | OUTPATIENT
Start: 2022-12-18 | End: 2022-12-18

## 2022-12-18 RX ORDER — ACETAMINOPHEN 500 MG
1000 TABLET ORAL ONCE
Refills: 0 | Status: COMPLETED | OUTPATIENT
Start: 2022-12-18 | End: 2022-12-18

## 2022-12-18 RX ORDER — METOCLOPRAMIDE HCL 10 MG
10 TABLET ORAL ONCE
Refills: 0 | Status: COMPLETED | OUTPATIENT
Start: 2022-12-18 | End: 2022-12-18

## 2022-12-18 RX ADMIN — Medication 400 MILLIGRAM(S): at 20:48

## 2022-12-18 RX ADMIN — Medication 10 MILLIGRAM(S): at 20:49

## 2022-12-18 RX ADMIN — SODIUM CHLORIDE 1000 MILLILITER(S): 9 INJECTION INTRAMUSCULAR; INTRAVENOUS; SUBCUTANEOUS at 20:49

## 2022-12-18 RX ADMIN — Medication 25 MILLIGRAM(S): at 20:49

## 2022-12-18 NOTE — ED ADULT NURSE NOTE - NSIMPLEMENTINTERV_GEN_ALL_ED
Implemented All Fall Risk Interventions:  [place] to call system. Call bell, personal items and telephone within reach. Instruct patient to call for assistance. Room bathroom lighting operational. Non-slip footwear when patient is off stretcher. Physically safe environment: no spills, clutter or unnecessary equipment. Stretcher in lowest position, wheels locked, appropriate side rails in place. Provide visual cue, wrist band, yellow gown, etc. Monitor gait and stability. Monitor for mental status changes and reorient to person, place, and time. Review medications for side effects contributing to fall risk. Reinforce activity limits and safety measures with patient and family.

## 2022-12-18 NOTE — ED ADULT NURSE REASSESSMENT NOTE - NS ED NURSE REASSESS COMMENT FT1
pt reavaluated and vital signs stable d/c home to follow up verbalizes good relief and understanding to follow up

## 2022-12-18 NOTE — ED PROVIDER NOTE - PATIENT PORTAL LINK FT
You can access the FollowMyHealth Patient Portal offered by [facility] by registering at the following website: [email or web address]. By joining [facility]’s FollowMyHealth portal, you will also be able to view your health information using other applications (apps) compatible with our system.

## 2022-12-18 NOTE — ED PROVIDER NOTE - OBJECTIVE STATEMENT
Patient is a 27-year-old female with no past medical history coming in complaining of left-sided headache radiating to the back for the last 3 days.  Patient having associated nausea vomiting photophobia phonophobia.  Patient denies any blurry vision numbness tingling weakness.  Patient states she is also having vomiting and diarrhea denies any urinary symptoms.  Patient states that she is a  and all her kids are sick with viruses.  Patient's been taking Tylenol Motrin around-the-clock.  T-max fever 102.

## 2022-12-18 NOTE — ED ADULT TRIAGE NOTE - CHIEF COMPLAINT QUOTE
Patient ambulatory to ER with complains of migraine pain and fever x 3 days.  Patient stated she is a  and has been exposed to sick children. Patient received awake, alert, and orientated. No distress noted. Vital signs stable.No obvious signs or symptoms of injury.

## 2022-12-18 NOTE — ED PROVIDER NOTE - CLINICAL SUMMARY MEDICAL DECISION MAKING FREE TEXT BOX
Patient is a 27-year-old female with no past medical history coming in complaining of left-sided headache radiating to the back for the last 3 days.  Patient having associated nausea vomiting photophobia phonophobia.  Patient denies any blurry vision numbness tingling weakness.  Patient states she is also having vomiting and diarrhea denies any urinary symptoms.  Patient states that she is a  and all her kids are sick with viruses.  Patient's been taking Tylenol Motrin around-the-clock.  T-max fever 102.  Patient was treated with migraine medications such as Reglan and Toradol and IV fluids and the patient's headache has gone from a 9 to a 3.  Patient without a rash no neck stiffness and appears well and pleasant patient's viral panel is negative for flu but is still likely the result of some other viral infection.  We will discharge the patient on fluids and Tylenol for pain to follow-up with her primary doctor tomorrow patient to return for worsening symptoms

## 2022-12-18 NOTE — ED PROVIDER NOTE - CONSTITUTIONAL, MLM
normal... Well appearing, awake, alert, oriented to person, place, time/situation and in no apparent distress. nrom of neck no midline pain no meningeal signs

## 2022-12-18 NOTE — ED PROVIDER NOTE - NSFOLLOWUPINSTRUCTIONS_ED_ALL_ED_FT
Return to the emergency department for high fevers rash severe pain on light to the eyes or uncontrolled vomiting.  Take Motrin 600 mg every 6 hours with food for fever or pain.  Call your doctor  in the morning

## 2022-12-18 NOTE — ED PROVIDER NOTE - PROGRESS NOTE DETAILS
Patient's headache pain is improved from a 9 out of 10 on presentation to 2 out of 10 currently with just Tylenol fluids and Reglan.  Patient is okay for discharge we will have the patient take Motrin for pain or fever and follow-up with her primary doctor tomorrow return for rash uncontrolled vomiting or severe headache

## 2022-12-18 NOTE — ED ADULT NURSE NOTE - OBJECTIVE STATEMENT
received pt stable alert oriented x4 no distress pt c/o headache and fever x 3 days pt evaluated by MD awaiting

## 2023-02-19 ENCOUNTER — NON-APPOINTMENT (OUTPATIENT)
Age: 28
End: 2023-02-19

## 2023-04-14 ENCOUNTER — NON-APPOINTMENT (OUTPATIENT)
Age: 28
End: 2023-04-14

## 2023-04-30 NOTE — ED ADULT NURSE NOTE - CINV DISCH TEACH PARTICIP
Nurse's Notes                                                                                     

 [facility]                                                                 

Name: Becca Ruff                                                                           

Age: 9 months                                                                                     

Sex: Male                                                                                         

: 2020                                                                                   

MRN: [id number]                                                                                   

Arrival Date: 2023                                                                          

Time: 08:58                                                                                       

Account#: [id number]                                                                            

Bed DX3                                                                                           

Private MD:                                                                                       

Diagnosis: Acute bronchiolitis, unspecified;Teething syndrome;Insect bite (nonvenomous) of other  

  part of head                                                                                    

                                                                                                  

Presentation:                                                                                     

                                                                                             

09:13 Chief complaint: Pt's mother states "last night I noticed that he had a little bite on  aa5 

      his left eye and this morning about an hour ago I noticed it was very red and swollen".     

      Redness and swelling noted to left upper eyelid. Pt's mother also reports the pt having     

      congestion and cough, seen by PCP and prescribed nebulizer without improvement, pt's        

      mother states "he is having yellow drainage now".                                           

09:13 Coronavirus screen: congestion, cough unrelated to allergies. Ebola Screen: Patient     aa5 

      denies travel to an Ebola-affected area in the 21 days before illness onset. Onset of       

      symptoms was 2023.                                                                

09:13 Acuity: HAILEE 4                                                                           aa5 

09:13 Method Of Arrival: Ambulatory                                                           aa5 

                                                                                                  

Historical:                                                                                       

- Allergies:                                                                                      

09:13 No Known Allergies;                                                                     aa5 

- PMHx:                                                                                           

09:13 None;                                                                                   aa5 

- PSHx:                                                                                           

09:13 None;                                                                                   aa5 

                                                                                                  

- Immunization history:: Childhood immunizations are up to date.                                  

                                                                                                  

                                                                                                  

Screening:                                                                                        

10:35 Humpty Dumpty Scale Fall Assessment Tool (age< 18yrs) Age. Abuse screen: Denies threats iw  

      or abuse. Denies injuries from another. Nutritional screening: No deficits noted.           

      Tuberculosis screening: No symptoms or risk factors identified.                             

                                                                                                  

Assessment:                                                                                       

09:10 General: Appears comfortable, Behavior is calm, cooperative. Pain: Unable to use pain   aa5 

      scale. FLACC scale score is 0 out of 10. Neuro: Level of Consciousness is awake, alert.     

      Cardiovascular: Heart tones S1 S2 present Rhythm is regular. Respiratory: Airway is         

      patent Respiratory effort is even, unlabored, Respiratory pattern is regular,               

      symmetrical, chest congestion Parent/caregiver reports the patient having cough and         

      congestion. GI: Abdomen is round Bowel sounds present X 4 quads. Abd is soft X 4 quads.     

      : No signs and/or symptoms were reported regarding the genitourinary system. EENT:        

      swelling and redness noted to left upper eyelid . Derm: Skin is pink, warm \T\ dry.         

      Musculoskeletal: Range of motion: intact in all extremities. Age appropriate behavior-      

      Infant (0 to 12 months): attachment to parent, trusting.                                    

                                                                                                  

Vital Signs:                                                                                      

09:13 Pulse 145; Resp 34 S; Temp 97.5(A); Pulse Ox 99% on R/A; Weight 9.3 kg (M);             aa5 

                                                                                                  

ED Course:                                                                                        

08:59 Patient arrived in ED.                                                                  am2 

09:05 [facility]                                                          snw 

09:05 Alessandro Ordonez MD is Attending Physician.                                               snw 

09:10 Arm band placed on.                                                                     aa5 

09:10 Patient has correct armband on for positive identification. Adult w/ patient.           aa5 

09:25 Triage completed.                                                                       aa5 

10:34 Sophie Araujo, RN is Primary Nurse.                                                   iw  

10:34 No provider procedures requiring assistance completed. Patient did not have IV access   iw  

      during this emergency room visit.                                                           

                                                                                                  

Administered Medications:                                                                         

No medications were administered                                                                  

                                                                                                  

                                                                                                  

Medication:                                                                                       

10:35 VIS not applicable for this client.                                                     iw  

                                                                                                  

Outcome:                                                                                          

10:32 Discharge ordered by MD.                                                                snw 

10:35 Discharged to home with family.                                                         iw  

10:35 Condition: good                                                                             

10:35 Discharge instructions given to family, Instructed on discharge instructions, follow up     

      and referral plans. medication usage, Demonstrated understanding of instructions,           

      follow-up care, medications, Prescriptions given X 3.                                       

10:35 Patient left the ED.                                                                    iw  

                                                                                                  

Signatures:                                                                                       

Araceli Amaya FNP-C                   FNP-Csnw                                                  

Sophie Araujo RN RN   iw                                                   

Micaela Chen RN                     RN   aa5                                                  

Christine Valdez                               am2                                                  

                                                                                                  

Corrections: (The following items were deleted from the chart)                                    

09:14 09:13 9.3 kg Measured; aa5                                                              aa5 

09:16 09:13 Pulse 145bpm; Resp 34bpm; Spontaneous; Pulse Ox 99% RA; 9.3 kg Measured; aa5      aa5 

09:35 09:13 Arm band placed on aa5                                                            aa5 

                                                                                                  

************************************************************************************************** Patient

## 2023-08-25 ENCOUNTER — EMERGENCY (EMERGENCY)
Facility: HOSPITAL | Age: 28
LOS: 1 days | Discharge: HOME | End: 2023-08-25
Attending: EMERGENCY MEDICINE | Admitting: EMERGENCY MEDICINE
Payer: COMMERCIAL

## 2023-08-25 VITALS
HEIGHT: 63 IN | DIASTOLIC BLOOD PRESSURE: 81 MMHG | HEART RATE: 77 BPM | SYSTOLIC BLOOD PRESSURE: 116 MMHG | RESPIRATION RATE: 16 BRPM | WEIGHT: 139.99 LBS | OXYGEN SATURATION: 98 % | TEMPERATURE: 98 F

## 2023-08-25 LAB — HCG UR QL: NEGATIVE — SIGNIFICANT CHANGE UP

## 2023-08-25 PROCEDURE — 99284 EMERGENCY DEPT VISIT MOD MDM: CPT | Mod: 25

## 2023-08-25 PROCEDURE — 99284 EMERGENCY DEPT VISIT MOD MDM: CPT

## 2023-08-25 PROCEDURE — 96375 TX/PRO/DX INJ NEW DRUG ADDON: CPT

## 2023-08-25 PROCEDURE — 96374 THER/PROPH/DIAG INJ IV PUSH: CPT

## 2023-08-25 PROCEDURE — 96361 HYDRATE IV INFUSION ADD-ON: CPT

## 2023-08-25 PROCEDURE — 81025 URINE PREGNANCY TEST: CPT

## 2023-08-25 RX ORDER — UBROGEPANT 100 MG/1
1 TABLET ORAL
Refills: 0 | DISCHARGE

## 2023-08-25 RX ORDER — SODIUM CHLORIDE 9 MG/ML
1000 INJECTION INTRAMUSCULAR; INTRAVENOUS; SUBCUTANEOUS
Refills: 0 | Status: COMPLETED | OUTPATIENT
Start: 2023-08-25 | End: 2023-08-25

## 2023-08-25 RX ORDER — METOCLOPRAMIDE HCL 10 MG
10 TABLET ORAL ONCE
Refills: 0 | Status: COMPLETED | OUTPATIENT
Start: 2023-08-25 | End: 2023-08-25

## 2023-08-25 RX ORDER — KETOROLAC TROMETHAMINE 30 MG/ML
30 SYRINGE (ML) INJECTION ONCE
Refills: 0 | Status: DISCONTINUED | OUTPATIENT
Start: 2023-08-25 | End: 2023-08-25

## 2023-08-25 RX ADMIN — Medication 30 MILLIGRAM(S): at 22:24

## 2023-08-25 RX ADMIN — Medication 10 MILLIGRAM(S): at 21:40

## 2023-08-25 RX ADMIN — SODIUM CHLORIDE 1000 MILLILITER(S): 9 INJECTION INTRAMUSCULAR; INTRAVENOUS; SUBCUTANEOUS at 21:41

## 2023-08-25 RX ADMIN — SODIUM CHLORIDE 1000 MILLILITER(S): 9 INJECTION INTRAMUSCULAR; INTRAVENOUS; SUBCUTANEOUS at 22:57

## 2023-08-25 RX ADMIN — SODIUM CHLORIDE 1000 MILLILITER(S): 9 INJECTION INTRAMUSCULAR; INTRAVENOUS; SUBCUTANEOUS at 22:09

## 2023-08-25 RX ADMIN — Medication 30 MILLIGRAM(S): at 22:09

## 2023-08-25 NOTE — ED PROVIDER NOTE - CLINICAL SUMMARY MEDICAL DECISION MAKING FREE TEXT BOX
This patient is a 28-year-old female has a history of migraine headaches patient recently given a sample of Ubrelvy by her primary doctor.  Patient states starting last night at 11 PM with right sided headache that was progressive and typical of her migraine.  Today patient with nausea and vomiting only few episodes as well as photophobia.  Patient took Ubrelvy tablet and had no relief.  Patient denies fevers chills URI symptoms rash, neck stiffness denies phonophobia.  Patient came for evaluation of headache.   Patient on exam in no apparent distress well-hydrated neck is supple no rashes on neuro exam is intact we will treat migraine with IV fluids Reglan and Toradol and reassess.  If improved will discharge the patient follow-up with her primary care doctor or neurologist

## 2023-08-25 NOTE — ED PROVIDER NOTE - OBJECTIVE STATEMENT
This patient is a 28-year-old female has a history of migraine headaches patient recently given a sample of Ubrelvy by her primary doctor.  Patient states starting last night at 11 PM with right sided headache that was progressive and typical of her migraine.  Today patient with nausea and vomiting only few episodes as well as photophobia.  Patient took Ubrelvy tablet and had no relief.  Patient denies fevers chills URI symptoms rash, neck stiffness denies phonophobia.  Patient came for evaluation of headache

## 2023-08-25 NOTE — ED ADULT TRIAGE NOTE - CHIEF COMPLAINT QUOTE
Pt with hx migraine h/a states she has a migraine since 1100, states her usual medication did not work

## 2023-08-25 NOTE — ED ADULT NURSE NOTE - NSFALLUNIVINTERV_ED_ALL_ED
Bed/Stretcher in lowest position, wheels locked, appropriate side rails in place/Call bell, personal items and telephone in reach/Instruct patient to call for assistance before getting out of bed/chair/stretcher/Non-slip footwear applied when patient is off stretcher/[place] to call system/Physically safe environment - no spills, clutter or unnecessary equipment/Purposeful proactive rounding/Room/bathroom lighting operational, light cord in reach

## 2023-08-25 NOTE — ED PROVIDER NOTE - ENMT, MLM
Airway patent, Nasal mucosa clear. Mouth with normal mucosa. Throat has no vesicles, no oropharyngeal exudates and uvula is midline.  scalp nt

## 2023-08-25 NOTE — ED ADULT NURSE NOTE - NS ED NURSE LEVEL OF CONSCIOUSNESS MENTAL STATUS
Cardiac Surgery Pre-op Note:  CC: Patient is a 75y old  Female who presents with a chief complaint of multivessel CAD (29 Sep 2021 12:27)    Referring Physician:       DR. Karl Steni                                                                                      Surgeon: DR. Eric Edmond  Procedure: (Date) (Procedure) cabg    Allergies Aleve (Rash)  lisinopril (Rash)    HPI:  This is a 74 y/o female with PMHx of HTN, DM type 2 who presented to [facility] ED 21 complaining of worsening SOB and chest discomfort. Patient R/I NSTEMI and pulmonary edema with ECHo revealing EF 35%. Patient was transferred to [facility] 21 for a cardiac catheterization with possible PTCA/stent. Pt s/p  CATH: LVEDP 10, ostial LAD 95, OM 90, RPDA 90; RRA access, now transferred to [facility] for CABG evaluation and further management.     PAST MEDICAL & SURGICAL HISTORY:  Diabetes  Hypertension  Hyperlipidemia  NSTEMI (non-ST elevation myocardial infarction)  HTN (hypertension)  S/P cataract surgery    MEDICATIONS  (STANDING):  acetaminophen   Tablet .. 1000 milliGRAM(s) Oral once  ascorbic acid 2000 milliGRAM(s) Oral once  aspirin enteric coated 81 milliGRAM(s) Oral daily  atorvastatin 40 milliGRAM(s) Oral at bedtime  carvedilol 25 milliGRAM(s) Oral every 12 hours  cefuroxime  IVPB 1500 milliGRAM(s) IV Intermittent once  chlorhexidine 0.12% Liquid 15 milliLiter(s) Swish and Spit once  chlorhexidine 4% Liquid 1 Application(s) Topical once  heparin  Infusion 600 Unit(s)/Hr (6 mL/Hr) IV Continuous <Continuous>  hydrALAZINE 10 milliGRAM(s) Oral every 8 hours  hydrochlorothiazide 12.5 milliGRAM(s) Oral daily  influenza   Vaccine 0.5 milliLiter(s) IntraMuscular once  insulin lispro (ADMELOG) corrective regimen sliding scale   SubCutaneous three times a day before meals  insulin lispro (ADMELOG) corrective regimen sliding scale   SubCutaneous at bedtime  sodium chloride 0.9% lock flush 3 milliLiter(s) IV Push every 8 hours    Labs:                        10.9   8.33  )-----------( 267      ( 29 Sep 2021 06:51 )             33.9     138  |  100  |  36<H>  ----------------------------<  201<H>  4.3   |  25  |  1.00    Ca    8.9      29 Sep 2021 06:51  Phos  3.7       Mg     1.70         TPro  7.4  /  Alb  4.0  /  TBili  0.4  /  DBili  x   /  AST  25  /  ALT  13  /  AlkPhos  81    PT/INR - ( 28 Sep 2021 15:58 )   PT: 12.5 sec;   INR: 1.04 ratio    PTT - ( 30 Sep 2021 00:22 )  PTT:58.0 sec  Blood Type: ABO Interpretation: O ( @ 16:08)    HGB A1C: A1C with Estimated Average Glucose (21 @ 00:47)    A1C with Estimated Average Glucose Result: 7.5: Method: Immunoassay  Pro-BNP: Serum Pro-Brain Natriuretic Peptide: 3651 pg/mL ( @ 15:58)  Thyroid Panel:  @ 00:55/2.03  --/8.3/105  MRSA: MRSA PCR Result.: NotDetec ( @ 00:07)   / MSSA:   Urinalysis Basic - ( 28 Sep 2021 18:56 )  Color: Light Yellow / Appearance: Clear / S.013 / pH: x  Gluc: x / Ketone: Negative  / Bili: Negative / Urobili: Negative   Blood: x / Protein: Negative / Nitrite: Negative   Leuk Esterase: Large / RBC: 1 /hpf / WBC 12 /HPF   Sq Epi: x / Non Sq Epi: 3 /hpf / Bacteria: Negative    CXR: Xray Chest 1 View- PORTABLE-Routine (Xray Chest 1 View- PORTABLE-Routine in AM.) (21 @ 08:46) >  The cardiac silhouette is normal in size. The lungs are clear. No pleural effusion or pneumothorax. Calcified granuloma in the left middle lung field. No osseous abnormality.  IMPRESSION:  Clear lungs.  EKG:     Diagnosis Line NORMAL SINUS RHYTHM  LEFT VENTRICULAR HYPERTROPHY WITH REPOLARIZATION ABNORMALITY  CANNOT RULE OUT SEPTAL INFARCT , AGE UNDETERMINED  ABNORMAL ECG    < end of copied text >  Carotid Duplex:  < from: VA Duplex Carotid, Bilat (21 @ 16:53) >  Antegrade flow is noted within both vertebral arteries.  IMPRESSION: No significant hemodynamic stenosis of either carotid artery.    PFT's:    Echocardiogram: < from: Transthoracic Echocardiogram (21 @ 16:51) >  Conclusions:  1. Mitral annular calcificationwith calcified chordae and  calcified mitral leaflets with normal diastolic opening.  Minimal mitral regurgitation.  2. Aortic valve not well visualized; appears calcified with  decreased opening. Peak transaortic valve gradient equals  13 mm Hg, mean transaortic valve gradient equals 8 mm Hg,  estimated aortic valve area equals 1.6 sqcm (by continuity  equation), aortic valve velocity time integral equals 38  cm, consistent with mild aortic stenosis. Minimal aortic  regurgitation.  3. Endocardium not well visualized; grossly preserved left  ventricular systolic function with mild segmental wall  motion abnormalities. The apical septum and the apical  inferior segments appear hypokinetic. Recommend limited  repeat imaging with intravenous echo contrast to better  evaluate the LV. Patient did not agree to IV echo contrast  at the time of the atudy.  4. Normal right ventricular size and function.    Cardiac catheterization: < from: Cardiac Catheterization (21 @ 17:04) >  CORONARY VESSELS: The coronary circulation is right dominant.  LM:   --  LM: Angiography showed mild atherosclerosis with no flow limiting  lesions.  LAD:   --  Ostial LAD: There was a diffuse 95 % stenosis at a site with no  prior intervention. There was OSCAR grade 2 flow through the vessel  (partial perfusion).  CX:   --  OM1: There was a tubular 95 % stenosis at a site with no prior  intervention. There was OSCAR grade 3 flow through the vessel (brisk flow).  RCA:   --  Distal RCA: There was a tubular 30 % stenosis at a site with no  prior intervention. There was OSCAR grade 3 flow through the vessel (brisk  flow).  --  RPDA: There was a discrete 90 % stenosis at a site with no prior  intervention. There was OSCAR grade 3 flow through the vessel (brisk flow).  --  RPLS: Angiography showed mild atherosclerosis with no flow limiting  lesions.    Gen: WN/WD NAD  Neuro: AAOx3, nonfocal  Pulm: CTA B/L  CV: RRR, S1S2 +systolic murmur  Abd: Soft, NT, ND +BS  Ext: No edema, + peripheral pulses    Pt has AICD/PPM [ ] Yes  [x ] No             Brand Name:  Pre-op Beta Blocker ordered within 24 hrs of surgery (CABG ONLY)?  [x ] Yes  [ ] No  If not, Why?  Type & Cross  [x ] Yes  [ ] No  NPO after Midnight [x ] Yes  [ ] No  Pre-op ABX ordered, to be taped on chart:  [ x] Yes  [ ] No     Hibiclens/Peridex ordered [x ] Yes  [ ] No  Intraop on Hold: PRBCs, CXR, JESSEE [x ]   Consent obtained  [x ] Yes  [ ] No   Awake/Alert/Cooperative

## 2023-08-25 NOTE — ED PROVIDER NOTE - NSFOLLOWUPINSTRUCTIONS_ED_ALL_ED_FT
Drink lots of fluids.  Get adequate sleep.  Return for severe headache or uncontrolled vomiting or fever.  If you begin to have a headache take Motrin 600 mg with some food immediately along with the Ubrelvy.  Call your primary care doctor or neurologist for follow-up next week

## 2023-08-25 NOTE — ED PROVIDER NOTE - PATIENT PORTAL LINK FT
You can access the FollowMyHealth Patient Portal offered by [facility] by registering at the following website: [email or web address]. By joining [employer]’s FollowMyHealth portal, you will also be able to view your health information using other applications (apps) compatible with our system.

## 2023-08-25 NOTE — ED ADULT NURSE NOTE - OBJECTIVE STATEMENT
Pt. received alert and oriented x3 with chief complaint of headache since 11am today. Pt. has hx of migraines and was instructed by PMD to take ubrelvy. Pt. took medication w/ no relief.

## 2023-08-26 ENCOUNTER — NON-APPOINTMENT (OUTPATIENT)
Age: 28
End: 2023-08-26

## 2023-09-17 ENCOUNTER — NON-APPOINTMENT (OUTPATIENT)
Age: 28
End: 2023-09-17

## 2023-10-18 ENCOUNTER — EMERGENCY (EMERGENCY)
Facility: HOSPITAL | Age: 28
LOS: 1 days | Discharge: HOME | End: 2023-10-18
Attending: STUDENT IN AN ORGANIZED HEALTH CARE EDUCATION/TRAINING PROGRAM | Admitting: EMERGENCY MEDICINE
Payer: COMMERCIAL

## 2023-10-18 VITALS
DIASTOLIC BLOOD PRESSURE: 72 MMHG | TEMPERATURE: 98 F | OXYGEN SATURATION: 100 % | SYSTOLIC BLOOD PRESSURE: 111 MMHG | HEART RATE: 80 BPM | RESPIRATION RATE: 14 BRPM

## 2023-10-18 VITALS
RESPIRATION RATE: 16 BRPM | SYSTOLIC BLOOD PRESSURE: 127 MMHG | WEIGHT: 139.99 LBS | OXYGEN SATURATION: 100 % | HEIGHT: 63 IN | TEMPERATURE: 98 F | DIASTOLIC BLOOD PRESSURE: 81 MMHG | HEART RATE: 82 BPM

## 2023-10-18 LAB
ALBUMIN SERPL ELPH-MCNC: 3.9 G/DL — SIGNIFICANT CHANGE UP (ref 3.3–5)
ALBUMIN SERPL ELPH-MCNC: 3.9 G/DL — SIGNIFICANT CHANGE UP (ref 3.3–5)
ALP SERPL-CCNC: 69 U/L — SIGNIFICANT CHANGE UP (ref 40–120)
ALP SERPL-CCNC: 69 U/L — SIGNIFICANT CHANGE UP (ref 40–120)
ALT FLD-CCNC: 37 U/L — SIGNIFICANT CHANGE UP (ref 12–78)
ALT FLD-CCNC: 37 U/L — SIGNIFICANT CHANGE UP (ref 12–78)
ANION GAP SERPL CALC-SCNC: 5 MMOL/L — SIGNIFICANT CHANGE UP (ref 5–17)
ANION GAP SERPL CALC-SCNC: 5 MMOL/L — SIGNIFICANT CHANGE UP (ref 5–17)
AST SERPL-CCNC: 20 U/L — SIGNIFICANT CHANGE UP (ref 15–37)
AST SERPL-CCNC: 20 U/L — SIGNIFICANT CHANGE UP (ref 15–37)
BASOPHILS # BLD AUTO: 0.05 K/UL — SIGNIFICANT CHANGE UP (ref 0–0.2)
BASOPHILS # BLD AUTO: 0.05 K/UL — SIGNIFICANT CHANGE UP (ref 0–0.2)
BASOPHILS NFR BLD AUTO: 0.5 % — SIGNIFICANT CHANGE UP (ref 0–2)
BASOPHILS NFR BLD AUTO: 0.5 % — SIGNIFICANT CHANGE UP (ref 0–2)
BILIRUB SERPL-MCNC: 0.3 MG/DL — SIGNIFICANT CHANGE UP (ref 0.2–1.2)
BILIRUB SERPL-MCNC: 0.3 MG/DL — SIGNIFICANT CHANGE UP (ref 0.2–1.2)
BUN SERPL-MCNC: 15 MG/DL — SIGNIFICANT CHANGE UP (ref 7–23)
BUN SERPL-MCNC: 15 MG/DL — SIGNIFICANT CHANGE UP (ref 7–23)
CALCIUM SERPL-MCNC: 9.1 MG/DL — SIGNIFICANT CHANGE UP (ref 8.5–10.1)
CALCIUM SERPL-MCNC: 9.1 MG/DL — SIGNIFICANT CHANGE UP (ref 8.5–10.1)
CHLORIDE SERPL-SCNC: 108 MMOL/L — SIGNIFICANT CHANGE UP (ref 96–108)
CHLORIDE SERPL-SCNC: 108 MMOL/L — SIGNIFICANT CHANGE UP (ref 96–108)
CO2 SERPL-SCNC: 28 MMOL/L — SIGNIFICANT CHANGE UP (ref 22–31)
CO2 SERPL-SCNC: 28 MMOL/L — SIGNIFICANT CHANGE UP (ref 22–31)
CREAT SERPL-MCNC: 0.62 MG/DL — SIGNIFICANT CHANGE UP (ref 0.5–1.3)
CREAT SERPL-MCNC: 0.62 MG/DL — SIGNIFICANT CHANGE UP (ref 0.5–1.3)
EGFR: 126 ML/MIN/1.73M2 — SIGNIFICANT CHANGE UP
EGFR: 126 ML/MIN/1.73M2 — SIGNIFICANT CHANGE UP
EOSINOPHIL # BLD AUTO: 0.42 K/UL — SIGNIFICANT CHANGE UP (ref 0–0.5)
EOSINOPHIL # BLD AUTO: 0.42 K/UL — SIGNIFICANT CHANGE UP (ref 0–0.5)
EOSINOPHIL NFR BLD AUTO: 4 % — SIGNIFICANT CHANGE UP (ref 0–6)
EOSINOPHIL NFR BLD AUTO: 4 % — SIGNIFICANT CHANGE UP (ref 0–6)
ERYTHROCYTE [SEDIMENTATION RATE] IN BLOOD: 18 MM/HR — HIGH (ref 0–15)
ERYTHROCYTE [SEDIMENTATION RATE] IN BLOOD: 18 MM/HR — HIGH (ref 0–15)
GLUCOSE SERPL-MCNC: 100 MG/DL — HIGH (ref 70–99)
GLUCOSE SERPL-MCNC: 100 MG/DL — HIGH (ref 70–99)
HCG SERPL-ACNC: <1 MIU/ML — SIGNIFICANT CHANGE UP
HCG SERPL-ACNC: <1 MIU/ML — SIGNIFICANT CHANGE UP
HCT VFR BLD CALC: 39.8 % — SIGNIFICANT CHANGE UP (ref 34.5–45)
HCT VFR BLD CALC: 39.8 % — SIGNIFICANT CHANGE UP (ref 34.5–45)
HGB BLD-MCNC: 13 G/DL — SIGNIFICANT CHANGE UP (ref 11.5–15.5)
HGB BLD-MCNC: 13 G/DL — SIGNIFICANT CHANGE UP (ref 11.5–15.5)
IMM GRANULOCYTES NFR BLD AUTO: 0.3 % — SIGNIFICANT CHANGE UP (ref 0–0.9)
IMM GRANULOCYTES NFR BLD AUTO: 0.3 % — SIGNIFICANT CHANGE UP (ref 0–0.9)
LYMPHOCYTES # BLD AUTO: 3.84 K/UL — HIGH (ref 1–3.3)
LYMPHOCYTES # BLD AUTO: 3.84 K/UL — HIGH (ref 1–3.3)
LYMPHOCYTES # BLD AUTO: 36.8 % — SIGNIFICANT CHANGE UP (ref 13–44)
LYMPHOCYTES # BLD AUTO: 36.8 % — SIGNIFICANT CHANGE UP (ref 13–44)
MCHC RBC-ENTMCNC: 29.9 PG — SIGNIFICANT CHANGE UP (ref 27–34)
MCHC RBC-ENTMCNC: 29.9 PG — SIGNIFICANT CHANGE UP (ref 27–34)
MCHC RBC-ENTMCNC: 32.7 GM/DL — SIGNIFICANT CHANGE UP (ref 32–36)
MCHC RBC-ENTMCNC: 32.7 GM/DL — SIGNIFICANT CHANGE UP (ref 32–36)
MCV RBC AUTO: 91.5 FL — SIGNIFICANT CHANGE UP (ref 80–100)
MCV RBC AUTO: 91.5 FL — SIGNIFICANT CHANGE UP (ref 80–100)
MONOCYTES # BLD AUTO: 0.76 K/UL — SIGNIFICANT CHANGE UP (ref 0–0.9)
MONOCYTES # BLD AUTO: 0.76 K/UL — SIGNIFICANT CHANGE UP (ref 0–0.9)
MONOCYTES NFR BLD AUTO: 7.3 % — SIGNIFICANT CHANGE UP (ref 2–14)
MONOCYTES NFR BLD AUTO: 7.3 % — SIGNIFICANT CHANGE UP (ref 2–14)
NEUTROPHILS # BLD AUTO: 5.34 K/UL — SIGNIFICANT CHANGE UP (ref 1.8–7.4)
NEUTROPHILS # BLD AUTO: 5.34 K/UL — SIGNIFICANT CHANGE UP (ref 1.8–7.4)
NEUTROPHILS NFR BLD AUTO: 51.1 % — SIGNIFICANT CHANGE UP (ref 43–77)
NEUTROPHILS NFR BLD AUTO: 51.1 % — SIGNIFICANT CHANGE UP (ref 43–77)
NRBC # BLD: 0 /100 WBCS — SIGNIFICANT CHANGE UP (ref 0–0)
NRBC # BLD: 0 /100 WBCS — SIGNIFICANT CHANGE UP (ref 0–0)
PLATELET # BLD AUTO: 285 K/UL — SIGNIFICANT CHANGE UP (ref 150–400)
PLATELET # BLD AUTO: 285 K/UL — SIGNIFICANT CHANGE UP (ref 150–400)
POTASSIUM SERPL-MCNC: 3.9 MMOL/L — SIGNIFICANT CHANGE UP (ref 3.5–5.3)
POTASSIUM SERPL-MCNC: 3.9 MMOL/L — SIGNIFICANT CHANGE UP (ref 3.5–5.3)
POTASSIUM SERPL-SCNC: 3.9 MMOL/L — SIGNIFICANT CHANGE UP (ref 3.5–5.3)
POTASSIUM SERPL-SCNC: 3.9 MMOL/L — SIGNIFICANT CHANGE UP (ref 3.5–5.3)
PROT SERPL-MCNC: 8.2 G/DL — SIGNIFICANT CHANGE UP (ref 6–8.3)
PROT SERPL-MCNC: 8.2 G/DL — SIGNIFICANT CHANGE UP (ref 6–8.3)
RBC # BLD: 4.35 M/UL — SIGNIFICANT CHANGE UP (ref 3.8–5.2)
RBC # BLD: 4.35 M/UL — SIGNIFICANT CHANGE UP (ref 3.8–5.2)
RBC # FLD: 13.1 % — SIGNIFICANT CHANGE UP (ref 10.3–14.5)
RBC # FLD: 13.1 % — SIGNIFICANT CHANGE UP (ref 10.3–14.5)
SODIUM SERPL-SCNC: 141 MMOL/L — SIGNIFICANT CHANGE UP (ref 135–145)
SODIUM SERPL-SCNC: 141 MMOL/L — SIGNIFICANT CHANGE UP (ref 135–145)
WBC # BLD: 10.44 K/UL — SIGNIFICANT CHANGE UP (ref 3.8–10.5)
WBC # BLD: 10.44 K/UL — SIGNIFICANT CHANGE UP (ref 3.8–10.5)
WBC # FLD AUTO: 10.44 K/UL — SIGNIFICANT CHANGE UP (ref 3.8–10.5)
WBC # FLD AUTO: 10.44 K/UL — SIGNIFICANT CHANGE UP (ref 3.8–10.5)

## 2023-10-18 PROCEDURE — 84702 CHORIONIC GONADOTROPIN TEST: CPT

## 2023-10-18 PROCEDURE — 99285 EMERGENCY DEPT VISIT HI MDM: CPT

## 2023-10-18 PROCEDURE — 96375 TX/PRO/DX INJ NEW DRUG ADDON: CPT

## 2023-10-18 PROCEDURE — 99284 EMERGENCY DEPT VISIT MOD MDM: CPT | Mod: 25

## 2023-10-18 PROCEDURE — 86140 C-REACTIVE PROTEIN: CPT

## 2023-10-18 PROCEDURE — 80053 COMPREHEN METABOLIC PANEL: CPT

## 2023-10-18 PROCEDURE — 36415 COLL VENOUS BLD VENIPUNCTURE: CPT

## 2023-10-18 PROCEDURE — 73110 X-RAY EXAM OF WRIST: CPT

## 2023-10-18 PROCEDURE — 85652 RBC SED RATE AUTOMATED: CPT

## 2023-10-18 PROCEDURE — 73110 X-RAY EXAM OF WRIST: CPT | Mod: 26,RT

## 2023-10-18 PROCEDURE — 85025 COMPLETE CBC W/AUTO DIFF WBC: CPT

## 2023-10-18 PROCEDURE — 96374 THER/PROPH/DIAG INJ IV PUSH: CPT

## 2023-10-18 RX ORDER — MORPHINE SULFATE 50 MG/1
4 CAPSULE, EXTENDED RELEASE ORAL ONCE
Refills: 0 | Status: DISCONTINUED | OUTPATIENT
Start: 2023-10-18 | End: 2023-10-18

## 2023-10-18 RX ORDER — OXYCODONE HYDROCHLORIDE 5 MG/1
1 TABLET ORAL
Qty: 12 | Refills: 0
Start: 2023-10-18 | End: 2023-10-20

## 2023-10-18 RX ORDER — KETOROLAC TROMETHAMINE 30 MG/ML
15 SYRINGE (ML) INJECTION ONCE
Refills: 0 | Status: DISCONTINUED | OUTPATIENT
Start: 2023-10-18 | End: 2023-10-18

## 2023-10-18 RX ADMIN — MORPHINE SULFATE 4 MILLIGRAM(S): 50 CAPSULE, EXTENDED RELEASE ORAL at 22:52

## 2023-10-18 RX ADMIN — Medication 15 MILLIGRAM(S): at 22:52

## 2023-10-18 NOTE — ED PROVIDER NOTE - PHYSICAL EXAMINATION
GENERAL: no acute distress; well-developed  HEAD:  Atraumatic, Normocephalic  EYES: EOMI, PERRLA, conjunctiva and sclera clear  ENT: MMM; oropharynx clear  NECK: Supple, No JVD  CHEST/LUNG: Clear to auscultation bilaterally; No wheeze  HEART: Regular rate and rhythm; No murmurs, rubs, or gallops  ABDOMEN: Soft, Nontender, Nondistended; Bowel sounds present  EXTREMITIES:  2+ Peripheral Pulses, Mild swelling R wrist with tenderness; no erythema. Passive/Active ROM limited by pain  PSYCH: AAOx3  NEUROLOGY: no focal motor or sensory deficits. 5/5 muscle strength in all extremities.   SKIN: No rashes or lesions

## 2023-10-18 NOTE — ED ADULT NURSE NOTE - NS ED NURSE LEVEL OF CONSCIOUSNESS AFFECT
Referral placed to podiatry - schedule patient for appointment on Monday if at all possible - send message over portal with appt info.  Patient is in [place] and returns Sunday night.  
Calm/Appropriate

## 2023-10-18 NOTE — ED PROVIDER NOTE - PROGRESS NOTE DETAILS
Pt signed out to me by Dr. Arreaga to f/u labs and discharge.  Labs reviewed. ESR minimally elevated. Splint applied. Pt advised to f/u with her orthopedist

## 2023-10-18 NOTE — ED PROVIDER NOTE - PATIENT PORTAL LINK FT
You can access the FollowMyHealth Patient Portal offered by [facility] by registering at the following website: [email or web address]. By joining [employer]’s FollowMyHealth portal, you will also be able to view your health information using other applications (apps) compatible with our system.

## 2023-10-18 NOTE — ED ADULT NURSE NOTE - NURSING MUSC EXTREMITY NORMAL ROM
left upper extremity/left lower extremity/right lower extremity
Neuro and cardiology consults done 2015 requested on chart.

## 2023-10-18 NOTE — ED PROVIDER NOTE - CLINICAL SUMMARY MEDICAL DECISION MAKING FREE TEXT BOX
29 y/o F hx of ganglion cyst removal R wrist 1 year ago presenting with R wrist pain.  Exquisite pain and mild swelling but no erythema or warmth noted  Screening labs unremarkable with normal Xray   Low suspicion of septic arthritis   Wrist splint; pain control.   Has orthopedic hand follow up scheduled.   Return precautions discussed

## 2023-10-18 NOTE — ED PROVIDER NOTE - NSFOLLOWUPINSTRUCTIONS_ED_ALL_ED_FT
Take Tylenol 650 mg every 6-8 hours or Motrin 400-600 mg every 6-8 hours as need for pain  Take oxycodone 5 mg as needed for pain that cannot be controlled  Use wrist splint as tolerated for comfort  Follow up with your orthopedic doctor as discussed  Seek Medical attention or return to the emergency department for any new or worsening symptoms.

## 2023-10-18 NOTE — ED ADULT NURSE NOTE - OBJECTIVE STATEMENT
Pt A&OX3, amb ad gloria, c/o pain X 2 months to right wrist, past week pt has been having increased pain X 3 days, swelling X 1 week and fever of 102 yesterday.  Mild swelling, no redness noted to right wrist.  Pt moving all digits well.  Pt denies any recent injury but has history of cyst removal X 1 year ago from right wrist.

## 2023-10-19 DIAGNOSIS — Z98.890 OTHER SPECIFIED POSTPROCEDURAL STATES: Chronic | ICD-10-CM

## 2023-10-19 LAB
CRP SERPL-MCNC: 8 MG/L — HIGH
CRP SERPL-MCNC: 8 MG/L — HIGH

## 2023-11-01 NOTE — ED PROVIDER NOTE - INTERNATIONAL TRAVEL
Progress Note    Patient: Vashti Kaye MRN: [id number]  SSN: [id number]    YOB: 1984  Age: 42 y.o. Sex: female        Chief Complaint   Patient presents with    Follow-Up from Hospital     she is a 42y.o. year old female who presents for follow up. Patient with 3 recent ED encounters. Seen for UTI and vaginal pain. Diagnosed with HSV. No prior outbreak. Anxiety, depression and asthma well controlled. Encounter Diagnoses   Name Primary? Herpes simplex Yes    Anxiety disorder, unspecified type     Adjustment disorder with depressed mood     Hospital discharge follow-up        Patient Active Problem List   Diagnosis    Mild intermittent asthma without complication    Palindromic rheumatism    Strain of muscle, fascia and tendon of lower back, initial encounter     Past Surgical History:   Procedure Laterality Date    GYN       Social History     Socioeconomic History    Marital status: Single     Spouse name: Not on file    Number of children: Not on file    Years of education: Not on file    Highest education level: Not on file   Occupational History    Not on file   Tobacco Use    Smoking status: Never     Passive exposure: Never    Smokeless tobacco: Never   Substance and Sexual Activity    Alcohol use: No    Drug use: No    Sexual activity: Not on file   Other Topics Concern    Not on file   Social History Narrative    Not on file     Social Determinants of Health     Financial Resource Strain: Low Risk  (5/30/2023)    Overall Financial Resource Strain (CARDIA)     Difficulty of Paying Living Expenses: Not hard at all   Food Insecurity: No Food Insecurity (5/30/2023)    Hunger Vital Sign     Worried About Running Out of Food in the Last Year: Never true     [address] in the Last Year: Never true   Transportation Needs: Unknown (5/30/2023)    PRAPARE - Transportation     Lack of Transportation (Medical): Not on file     Lack of Transportation (Non-Medical):  No   Physical Activity: No

## 2023-12-13 ENCOUNTER — NON-APPOINTMENT (OUTPATIENT)
Age: 28
End: 2023-12-13

## 2024-02-15 ENCOUNTER — OUTPATIENT (OUTPATIENT)
Dept: OUTPATIENT SERVICES | Facility: HOSPITAL | Age: 29
LOS: 1 days | End: 2024-02-15
Payer: COMMERCIAL

## 2024-02-15 DIAGNOSIS — Z98.890 OTHER SPECIFIED POSTPROCEDURAL STATES: Chronic | ICD-10-CM

## 2024-02-15 DIAGNOSIS — Z01.818 ENCOUNTER FOR OTHER PREPROCEDURAL EXAMINATION: ICD-10-CM

## 2024-02-15 LAB
A1C WITH ESTIMATED AVERAGE GLUCOSE RESULT: 5.4 % — SIGNIFICANT CHANGE UP (ref 4–5.6)
ANION GAP SERPL CALC-SCNC: 13 MMOL/L — SIGNIFICANT CHANGE UP (ref 5–17)
BASOPHILS # BLD AUTO: 0.04 K/UL — SIGNIFICANT CHANGE UP (ref 0–0.2)
BASOPHILS NFR BLD AUTO: 0.6 % — SIGNIFICANT CHANGE UP (ref 0–2)
BUN SERPL-MCNC: 11.9 MG/DL — SIGNIFICANT CHANGE UP (ref 8–20)
CALCIUM SERPL-MCNC: 8.9 MG/DL — SIGNIFICANT CHANGE UP (ref 8.4–10.5)
CHLORIDE SERPL-SCNC: 102 MMOL/L — SIGNIFICANT CHANGE UP (ref 96–108)
CO2 SERPL-SCNC: 23 MMOL/L — SIGNIFICANT CHANGE UP (ref 22–29)
CREAT SERPL-MCNC: 0.62 MG/DL — SIGNIFICANT CHANGE UP (ref 0.5–1.3)
EGFR: 125 ML/MIN/1.73M2 — SIGNIFICANT CHANGE UP
EOSINOPHIL # BLD AUTO: 0.19 K/UL — SIGNIFICANT CHANGE UP (ref 0–0.5)
EOSINOPHIL NFR BLD AUTO: 2.7 % — SIGNIFICANT CHANGE UP (ref 0–6)
ESTIMATED AVERAGE GLUCOSE: 108 MG/DL — SIGNIFICANT CHANGE UP (ref 68–114)
GLUCOSE SERPL-MCNC: 95 MG/DL — SIGNIFICANT CHANGE UP (ref 70–99)
HCT VFR BLD CALC: 38.3 % — SIGNIFICANT CHANGE UP (ref 34.5–45)
HGB BLD-MCNC: 12.3 G/DL — SIGNIFICANT CHANGE UP (ref 11.5–15.5)
IMM GRANULOCYTES NFR BLD AUTO: 0.3 % — SIGNIFICANT CHANGE UP (ref 0–0.9)
LYMPHOCYTES # BLD AUTO: 2.75 K/UL — SIGNIFICANT CHANGE UP (ref 1–3.3)
LYMPHOCYTES # BLD AUTO: 38.8 % — SIGNIFICANT CHANGE UP (ref 13–44)
MCHC RBC-ENTMCNC: 29.1 PG — SIGNIFICANT CHANGE UP (ref 27–34)
MCHC RBC-ENTMCNC: 32.1 GM/DL — SIGNIFICANT CHANGE UP (ref 32–36)
MCV RBC AUTO: 90.5 FL — SIGNIFICANT CHANGE UP (ref 80–100)
MONOCYTES # BLD AUTO: 0.59 K/UL — SIGNIFICANT CHANGE UP (ref 0–0.9)
MONOCYTES NFR BLD AUTO: 8.3 % — SIGNIFICANT CHANGE UP (ref 2–14)
NEUTROPHILS # BLD AUTO: 3.49 K/UL — SIGNIFICANT CHANGE UP (ref 1.8–7.4)
NEUTROPHILS NFR BLD AUTO: 49.3 % — SIGNIFICANT CHANGE UP (ref 43–77)
PLATELET # BLD AUTO: 276 K/UL — SIGNIFICANT CHANGE UP (ref 150–400)
POTASSIUM SERPL-MCNC: 4 MMOL/L — SIGNIFICANT CHANGE UP (ref 3.5–5.3)
POTASSIUM SERPL-SCNC: 4 MMOL/L — SIGNIFICANT CHANGE UP (ref 3.5–5.3)
RBC # BLD: 4.23 M/UL — SIGNIFICANT CHANGE UP (ref 3.8–5.2)
RBC # FLD: 12.9 % — SIGNIFICANT CHANGE UP (ref 10.3–14.5)
SODIUM SERPL-SCNC: 138 MMOL/L — SIGNIFICANT CHANGE UP (ref 135–145)
WBC # BLD: 7.08 K/UL — SIGNIFICANT CHANGE UP (ref 3.8–10.5)
WBC # FLD AUTO: 7.08 K/UL — SIGNIFICANT CHANGE UP (ref 3.8–10.5)

## 2024-02-15 PROCEDURE — 83036 HEMOGLOBIN GLYCOSYLATED A1C: CPT

## 2024-02-15 PROCEDURE — 80048 BASIC METABOLIC PNL TOTAL CA: CPT

## 2024-02-15 PROCEDURE — G0463: CPT

## 2024-02-15 PROCEDURE — 85025 COMPLETE CBC W/AUTO DIFF WBC: CPT

## 2024-02-15 PROCEDURE — 36415 COLL VENOUS BLD VENIPUNCTURE: CPT

## 2024-04-08 ENCOUNTER — NON-APPOINTMENT (OUTPATIENT)
Age: 29
End: 2024-04-08

## 2024-08-06 ENCOUNTER — NON-APPOINTMENT (OUTPATIENT)
Age: 29
End: 2024-08-06

## 2024-08-28 ENCOUNTER — NON-APPOINTMENT (OUTPATIENT)
Age: 29
End: 2024-08-28

## 2024-11-04 NOTE — ED PROVIDER NOTE - OBJECTIVE STATEMENT
29 y/o F hx of ganglion cyst removal R wrist 1 year ago presenting with R wrist pain.    Patient reports pain in wrist for the last two months; however in the 3-7 days pain and swelling have increased with decreased ROM of wrist. Reports fever of 102 yesterday. No recent trauma. No erythema. Planning to follow up with her orthopedic hand surgeon, but has not seen her yet. Taking acetaminophen and ibuprofen with little relief. 04-Nov-2024 09:57

## 2024-12-11 NOTE — ED PROVIDER NOTE - PSH
PMR consult complete.  Per Dr. Moore;      Recommend discharge home when medically cleared.  Patient is expected to progress quickly with PT OT, and has lots of support on discharge.  -Recommend home health PT OT  -Patient's spouse has bought a wheelchair, she will need a front wheel walker, and can eventually be trained on crutches.  -PMR will sign off, please reconsult or reach out via [employer] if further evaluation or medical management is requested  TCC no longer following   No significant past surgical history

## 2024-12-18 NOTE — ED ADULT NURSE NOTE - NS_SISCREENINGSR_GEN_ALL_ED
Chief Complaint   Patient presents with    Weight Management    Medication Refill     1. Have you been to the ER, urgent care clinic since your last visit?  Hospitalized since your last visit?No    2. Have you seen or consulted any other health care providers outside of the [facility] since your last visit?  Include any pap smears or colon screening. No     Negative

## 2025-02-05 NOTE — ED PROVIDER NOTE - MUSCULOSKELETAL NEGATIVE STATEMENT, MLM
[facility] - Gastroenterology                                                                                                  Clinic History and Physical     HPI:   The patient verbally consents to a Virtual/Telephone Check-In visit on 12/29/23.     Patient understands and accepts financial responsibility for any deductible, co-insurance and/or co-pays associated with this service.     Duration of the service: 30 minutes     Julieta Goff is a 63 year old year-old female with medical history including lymphocytopenia, GERD, fatty liver disease, hyperlipidemia, hyperthyroidism, depression, anxiety, who presents virtually for follow up.    Today:  #irregular bowel habits  -taking miralax 2-3x a week  -has multiple small bowel movements throughout the day. No recent diarrhea    #GERD  -takes pantoprazole 40mg daily which has mostly resolved heartburn  -weight has been stable around 113lbs    #CRC screen  -last cologuard in 2021 was normal    #normocytic anemia  -slight hemoglobin drop on labs in Feb 2024, which resolved in March, no recent recheck    #abnl CT  -CT chest, abdomen, pelvis in 2023. Findings liver lesions, scattered mediastinum lymph nodes and stool burden    Prior visit 12/29/23  -she reports she has been taking famotidine 20mg BID, also taking tums as needed for heartburn  -has fluctuating diarrhea & constipation. Took a probiotic in the past which helped some  -last week she did an enema and had some output but still felt bloating & constipated  -reports weight loss has stabilized, has been around 118lb the past couple months  -ordered cologuard last visit but her insurance informed her she is not due until 2024, unsure what month    Prior visit 9/19/23:  Moved from [place] to [place] in November to help care for parents, Reports increased stress related to leaving friends, trouble finding a job and stress of parents health. Pt currently talking to a therapist.  -evaluated for GERD, weight  loss & CRC screen. Taking famotidine for GERD, was previously on prevacid but was told by nephrology to stop taking prevacid due to elevated creatinine. unintentional weight loss of 50lbs since November, reports weight is stable the last month. pt does report eating less food and having low appetite, sometimes would have abdominal pain which made her not want to eat    Last colonoscopy: around  (done at outside facility, normal per patient), last cologuard around  was normal   Last EGD: 10/30/23 (Dr. Davalos)  Impression:  -Esophagus: Small hiatal hernia, otherwise unremarkable with regular z-line. No evidence of esophagitis.  -Stomach: Unremarkable mucosa. Biopsied. Few, small scattered 1 mm to 3 mm polyps seen in the fundus/body -- these are fundic gland polyps.  -Duodenum: Unremarkable.  -Biopsies normal    2. Given symptoms that were previously controlled on Prevacid, we will reach out to the nephrologist to discuss ongoing use of proton pump inhibitors.  3. Continue Pepcid for now.    NSAIDS:none   Tobacco:none   Alcohol:social  Marijuana:none   Illicit drugs:none     FH GI malignancy: none     No history of adverse reaction to sedation  No JOSE RAUL  No anticoagulants  No pacemaker/defibrillator  No pain medications and/or sleep aides    Wt Readings from Last 6 Encounters:   25 114 lb (51.7 kg)   25 114 lb (51.7 kg)   24 114 lb (51.7 kg)   24 112 lb 6.4 oz (51 kg)   24 114 lb (51.7 kg)   24 114 lb (51.7 kg)          History, Medications, Allergies, ROS:      Past Medical History:    Allergic rhinitis    Anxiety    Arthritis    Asthma (HCC)    Depression    Esophageal reflux    Fatty liver    2023 ultrasound of abdomen    Hyperlipidemia    Hyperthyroidism    Renal insufficiency      Past Surgical History:   Procedure Laterality Date    Colonoscopy      Hysterectomy      -approximate          Other surgical history      Eye surgery    Tubal ligation        Family  Hx:   Family History   Problem Relation Age of Onset    Diabetes Father     Stroke Father     Depression Father     Obesity Sister       Social History:   Social History     Socioeconomic History    Marital status:    Tobacco Use    Smoking status: Never     Passive exposure: Never    Smokeless tobacco: Never   Vaping Use    Vaping status: Never Used   Substance and Sexual Activity    Alcohol use: Yes     Alcohol/week: 3.0 - 4.0 standard drinks of alcohol     Types: 3 - 4 Standard drinks or equivalent per week     Comment: occ    Drug use: Never    Sexual activity: Yes     Birth control/protection: Hysterectomy   Other Topics Concern    Caffeine Concern No    Exercise No    Seat Belt Yes    Special Diet No    Stress Concern Yes    Weight Concern No     Service No    Blood Transfusions No    Occupational Exposure No    Hobby Hazards No    Sleep Concern No    Back Care No    Bike Helmet No    Self-Exams No        Medications (Active prior to today's visit):  Current Outpatient Medications   Medication Sig Dispense Refill    MELOXICAM 15 MG Oral Tab TAKE 1 TABLET(15 MG) BY MOUTH DAILY 30 tablet 0    TRAZODONE 150 MG Oral Tab TAKE 1 TABLET(150 MG) BY MOUTH EVERY NIGHT AS NEEDED FOR SLEEP 90 tablet 1    LOSARTAN 50 MG Oral Tab TAKE 1 TABLET(50 MG) BY MOUTH TWICE DAILY 180 tablet 0    pantoprazole 40 MG Oral Tab EC Take 1 tablet (40 mg total) by mouth every morning before breakfast. 90 tablet 3    levothyroxine 75 MCG Oral Tab TAKE 1 TABLET(75 MCG) BY MOUTH BEFORE BREAKFAST 90 tablet 0    montelukast 10 MG Oral Tab Take 1 tablet (10 mg total) by mouth nightly. 90 tablet 0    ALPRAZolam 0.5 MG Oral Tab Take 1 tablet (0.5 mg total) by mouth daily as needed. 30 tablet 2    IBUPROFEN 600 MG Oral Tab TAKE 1 TABLET(600 MG) BY MOUTH EVERY 6 HOURS AS NEEDED FOR PAIN 40 tablet 0    Estradiol 10 MCG Vaginal Tab Place 10 mcg vaginally twice a week. 24 tablet 0    Cyanocobalamin (B-12 OR) Take by mouth.      buPROPion   MG Oral Tablet 24 Hr TAKE 1 TABLET(300 MG) BY MOUTH DAILY 90 tablet 3    valACYclovir 1 G Oral Tab Take 1 tablet (1,000 mg total) by mouth as needed. 2x 12 21 tablet 1    fluconazole (DIFLUCAN) 150 MG Oral Tab Take 1 tablet (150 mg total) by mouth every third day. 2 tablet 0    cetirizine 10 MG Oral Tab Take 1 tablet (10 mg total) by mouth daily.      cholecalciferol 25 MCG (1000 UT) Oral Tab Take 1 tablet (1,000 Units total) by mouth daily.      SYMBICORT 80-4.5 MCG/ACT Inhalation Aerosol Inhale 2 puffs into the lungs 2 (two) times daily.      fluticasone propionate 50 MCG/ACT Nasal Suspension 1 spray in each nostril Nasally Once a day as needed      CRANBERRY OR Take 15 mg by mouth daily.      Lifitegrast (XIIDRA OP) Apply to eye 2 (two) times a day.      albuterol 108 (90 Base) MCG/ACT Inhalation Aero Soln Inhale 2 puffs into the lungs every 4 (four) hours as needed.      Ascorbic Acid (VITAMIN C) 100 MG Oral Tab Take 1 tablet (100 mg total) by mouth in the morning and 1 tablet (100 mg total) before bedtime.      zinc sulfate 220 (50 Zn) MG Oral Cap Take 25 mg by mouth daily.      Multiple Vitamins-Minerals (MULTI-VITAMIN/MINERALS) Oral Tab Take 1 tablet by mouth daily.         Allergies:  Allergies   Allergen Reactions    Epinephrine OTHER (SEE COMMENTS)    Cats Claw, Uncaria Tomentosa RASH    Dander RASH     Dogs      Dust Mites RASH    Grass RASH    Mold RASH       ROS:   CONSTITUTIONAL: negative for fevers, chills, sweats  EYES Negative for scleral icterus or redness, and diplopia   HEENT: Negative for dysphagia and hoarseness  RESPIRATORY: Negative for cough and severe shortness of breath  CARDIOVASCULAR: Negative for crushing sub-sternal chest pain  GASTROINTESTINAL: See HPI  GENITOURINARY: Negative for dysuria  MUSCULOSKELETAL: Negative for arthralgias and myalgias  SKIN: Negative for jaundice, rash or pruritus  NEUROLOGICAL: Negative for dizziness and headaches  BEHAVIOR/PSYCH: Negative for  psychotic behavior and poor appetite      PHYSICAL EXAM:   There were no vitals taken for this visit.    Limited 2/2 virtual visit    GEN: Alert, awake  HEENT: clear speech  LUNGS: No conversational dyspnea  ABDOMEN: patient denies pain  NEURO: oriented  PSYCH: appropriate mood       Labs/Imaging:     Patient's labs and imaging were reviewed and discussed with patient today.    .  ASSESSMENT/PLAN:   Julieta Goff is a 63 year old year-old female with medical history including lymphocytopenia, GERD, fatty liver disease, hyperlipidemia, hyperthyroidism, depression, anxiety, who presents virtually for follow up.    Today:  #irregular bowel habits  -taking miralax 2-3x a week  -has multiple small bowel movements throughout the day. No recent diarrhea    #GERD  -takes pantoprazole 40mg daily which has mostly resolved heartburn  -weight has been stable around 113lbs    #CRC screen  -last cologuard in 2021 was normal    #normocytic anemia  -slight hemoglobin drop on labs in Feb 2024, which resolved in March, no recent recheck    #abnl CT  -CT chest, abdomen, pelvis in 2023. Findings liver lesions, scattered mediastinum lymph nodes and stool burden      Recommend:  -can continue miralax 2-3 days per week     -I recommend adding psyllium husk fiber such as metamucil. You can try taking a few days a week but increase to daily use if still having irregular or incomplete bowel movements    -cologuard test will be mailed for you     -continue pantoprazole daily for acid reflux    -go to lab for repeat CBC    -can call to schedule CT abdomen/pelvis #[phone]    Orders This Visit:  Orders Placed This Encounter   Procedures    CBC With Differential With Platelet       Meds This Visit:  Requested Prescriptions      No prescriptions requested or ordered in this encounter       Imaging & Referrals:  COLOGUARD COLON CANCER SCREENING (EXTERNAL)  CT CHEST+ABDOMEN+PELVIS(ALL [facility] ONLY)(WDB=[id number])       Charmaine Gorman  BRENT  [facility] Gastroenterology  2/6/2025         right sided neck pain and tightness

## 2025-04-17 NOTE — ED ADULT TRIAGE NOTE - NSWEIGHTCALCTOOLDRUG_GEN_A_CORE
Natural Calm -- 1/4 tsp daily for constipation  Will call you with vitamin d and b12 results  Radiology will call you for thyroid ultrasound and bone density test    used